# Patient Record
Sex: FEMALE | Race: WHITE | URBAN - METROPOLITAN AREA
[De-identification: names, ages, dates, MRNs, and addresses within clinical notes are randomized per-mention and may not be internally consistent; named-entity substitution may affect disease eponyms.]

---

## 2017-05-24 ENCOUNTER — EMERGENCY (EMERGENCY)
Facility: HOSPITAL | Age: 27
LOS: 0 days | Discharge: LEFT BEFORE DISCHARGE | End: 2017-05-24

## 2017-06-28 DIAGNOSIS — Z03.89 ENCOUNTER FOR OBSERVATION FOR OTHER SUSPECTED DISEASES AND CONDITIONS RULED OUT: ICD-10-CM

## 2017-06-29 ENCOUNTER — EMERGENCY (EMERGENCY)
Facility: HOSPITAL | Age: 27
LOS: 0 days | Discharge: HOME | End: 2017-06-29

## 2017-06-29 DIAGNOSIS — H57.8 OTHER SPECIFIED DISORDERS OF EYE AND ADNEXA: ICD-10-CM

## 2017-06-29 DIAGNOSIS — O21.0 MILD HYPEREMESIS GRAVIDARUM: ICD-10-CM

## 2017-06-29 DIAGNOSIS — O21.1 HYPEREMESIS GRAVIDARUM WITH METABOLIC DISTURBANCE: ICD-10-CM

## 2017-07-08 ENCOUNTER — EMERGENCY (EMERGENCY)
Facility: HOSPITAL | Age: 27
LOS: 0 days | Discharge: HOME | End: 2017-07-08

## 2017-07-08 DIAGNOSIS — O21.0 MILD HYPEREMESIS GRAVIDARUM: ICD-10-CM

## 2017-07-08 DIAGNOSIS — H00.011 HORDEOLUM EXTERNUM RIGHT UPPER EYELID: ICD-10-CM

## 2017-07-08 DIAGNOSIS — Z87.891 PERSONAL HISTORY OF NICOTINE DEPENDENCE: ICD-10-CM

## 2017-07-08 DIAGNOSIS — O21.1 HYPEREMESIS GRAVIDARUM WITH METABOLIC DISTURBANCE: ICD-10-CM

## 2017-07-12 ENCOUNTER — OUTPATIENT (OUTPATIENT)
Dept: OUTPATIENT SERVICES | Facility: HOSPITAL | Age: 27
LOS: 1 days | Discharge: HOME | End: 2017-07-12

## 2017-07-12 DIAGNOSIS — H00.11 CHALAZION RIGHT UPPER EYELID: ICD-10-CM

## 2017-07-12 DIAGNOSIS — O21.0 MILD HYPEREMESIS GRAVIDARUM: ICD-10-CM

## 2017-07-12 DIAGNOSIS — O21.1 HYPEREMESIS GRAVIDARUM WITH METABOLIC DISTURBANCE: ICD-10-CM

## 2017-11-02 ENCOUNTER — EMERGENCY (EMERGENCY)
Facility: HOSPITAL | Age: 27
LOS: 0 days | Discharge: HOME | End: 2017-11-02

## 2017-11-02 DIAGNOSIS — O21.0 MILD HYPEREMESIS GRAVIDARUM: ICD-10-CM

## 2017-11-02 DIAGNOSIS — O21.1 HYPEREMESIS GRAVIDARUM WITH METABOLIC DISTURBANCE: ICD-10-CM

## 2017-11-08 DIAGNOSIS — M54.5 LOW BACK PAIN: ICD-10-CM

## 2017-11-08 DIAGNOSIS — R10.13 EPIGASTRIC PAIN: ICD-10-CM

## 2018-03-22 ENCOUNTER — EMERGENCY (EMERGENCY)
Facility: HOSPITAL | Age: 28
LOS: 0 days | Discharge: HOME | End: 2018-03-22

## 2018-03-22 VITALS
DIASTOLIC BLOOD PRESSURE: 67 MMHG | OXYGEN SATURATION: 97 % | RESPIRATION RATE: 18 BRPM | TEMPERATURE: 97 F | HEART RATE: 81 BPM | SYSTOLIC BLOOD PRESSURE: 123 MMHG

## 2018-03-22 DIAGNOSIS — M54.9 DORSALGIA, UNSPECIFIED: ICD-10-CM

## 2018-03-22 DIAGNOSIS — M54.5 LOW BACK PAIN: ICD-10-CM

## 2018-03-22 RX ORDER — IBUPROFEN 200 MG
1 TABLET ORAL
Qty: 28 | Refills: 0 | OUTPATIENT
Start: 2018-03-22 | End: 2018-03-28

## 2018-03-22 RX ORDER — METHOCARBAMOL 500 MG/1
2 TABLET, FILM COATED ORAL
Qty: 30 | Refills: 0 | OUTPATIENT
Start: 2018-03-22 | End: 2018-03-26

## 2018-03-22 RX ORDER — IBUPROFEN 200 MG
600 TABLET ORAL ONCE
Qty: 0 | Refills: 0 | Status: DISCONTINUED | OUTPATIENT
Start: 2018-03-22 | End: 2018-03-22

## 2018-03-22 NOTE — ED PROVIDER NOTE - PHYSICAL EXAMINATION
PHYSICAL EXAM:    GENERAL: Alert, appears stated age, well appearing, non-toxic  SKIN: Warm, pink and dry. MMM.   EYE: Normal lids/conjunctiva  ENT: Normal hearing, patent oropharynx  NECK: +supple. No meningismus  Pulm: Bilateral BS, normal resp effort, no wheezes, stridor, or retractions  CV: RRR, no M/R/G, 2+ pulses throughout  Abd: soft, non-tender, non-distended, no hepatosplenomegaly. no CVA tenderness.   Mskel: no erythema, cyanosis, edema. +L paraspinal lumbar TTP. no spinal TTP.   Neuro: AAOx3, no sensory/motor deficits, CN 2-12 intact. No speech slurring, pronator drift, facial asymmetry. normal finger-to-nose b/l. 5/5 strength throughout. normal gait. negative romberg.

## 2018-03-22 NOTE — ED PROVIDER NOTE - PROGRESS NOTE DETAILS
Counseled on red flags and to return for them. Counseled on importance of follow up. Patient repeats back instructions. Patient advised that they or their doctor may call [phone] to follow up on the specific results of the tests performed today in the emergency department.   Patient appears well on discharge.

## 2018-03-22 NOTE — ED PROVIDER NOTE - OBJECTIVE STATEMENT
29 y/o F without PMH presents with R lower back pain radiating to R leg x 3 days s/p waking up in the am and hearing/feeling a "crack". No paraesthesias. no saddle anesthesia, bowel or bladder dysfunction. Denies CP, palpitations, SOB, abdominal pain, n/v/d, black or bloody stools, fevers, sweats, chills, HA, vision changes, trauma, fall, cough, recent travel, recent illness, sick contacts, leg pain/swelling, urinary symptoms, rash.

## 2018-03-22 NOTE — ED PROVIDER NOTE - NS ED ROS FT
Review of Systems    Constitutional: (-) fever  Eyes/ENT: (-) blurry vision  Cardiovascular: (-) chest pain, (-) syncope  Respiratory: (-) cough, (-) shortness of breath  Gastrointestinal: (-) vomiting, (-) diarrhea  Musculoskeletal: (-) neck pain  Integumentary: (-) rash, (-) edema  Neurological: (-) headache, (-) altered mental status

## 2018-03-26 ENCOUNTER — EMERGENCY (EMERGENCY)
Facility: HOSPITAL | Age: 28
LOS: 0 days | Discharge: HOME | End: 2018-03-26
Attending: EMERGENCY MEDICINE

## 2018-03-26 VITALS
SYSTOLIC BLOOD PRESSURE: 123 MMHG | RESPIRATION RATE: 18 BRPM | TEMPERATURE: 98 F | OXYGEN SATURATION: 98 % | DIASTOLIC BLOOD PRESSURE: 59 MMHG | HEART RATE: 68 BPM

## 2018-03-26 DIAGNOSIS — Z79.891 LONG TERM (CURRENT) USE OF OPIATE ANALGESIC: ICD-10-CM

## 2018-03-26 DIAGNOSIS — M54.5 LOW BACK PAIN: ICD-10-CM

## 2018-03-26 DIAGNOSIS — Z79.899 OTHER LONG TERM (CURRENT) DRUG THERAPY: ICD-10-CM

## 2018-03-26 RX ORDER — DEXAMETHASONE 0.5 MG/5ML
10 ELIXIR ORAL ONCE
Qty: 0 | Refills: 0 | Status: COMPLETED | OUTPATIENT
Start: 2018-03-26 | End: 2018-03-26

## 2018-03-26 RX ADMIN — Medication 10 MILLIGRAM(S): at 19:06

## 2018-03-26 NOTE — ED PROVIDER NOTE - OBJECTIVE STATEMENT
27 y/o F with no PMH presents complaining of lower midline lumbar pain for 7 days, worse on movement better at rest. No saddle anesthesia. No numbness or tingling. No IV drug use. No DM or steroid use. No incontinence.

## 2018-03-26 NOTE — ED PROVIDER NOTE - MEDICAL DECISION MAKING DETAILS
Chart finished.  29 yo woman with lower back pain improved with palpation shooting down legs occasionally.  Not respodning to ibuprofen and robaxin.   Will change to buprofen and tizandiine and add corticosteroids.  Outpatient follow up.  Normal Neuro examination.  Ambulating in ED.  No bowel or bladder issues.  Stable for discharge.

## 2018-09-02 ENCOUNTER — INPATIENT (INPATIENT)
Facility: HOSPITAL | Age: 28
LOS: 0 days | Discharge: HOME | End: 2018-09-02
Attending: HOSPITALIST | Admitting: HOSPITALIST
Payer: MEDICAID

## 2018-09-02 VITALS
HEART RATE: 87 BPM | RESPIRATION RATE: 18 BRPM | WEIGHT: 169.98 LBS | HEIGHT: 70 IN | OXYGEN SATURATION: 97 % | DIASTOLIC BLOOD PRESSURE: 76 MMHG | TEMPERATURE: 98 F | SYSTOLIC BLOOD PRESSURE: 128 MMHG

## 2018-09-02 VITALS
DIASTOLIC BLOOD PRESSURE: 83 MMHG | RESPIRATION RATE: 17 BRPM | OXYGEN SATURATION: 100 % | TEMPERATURE: 98 F | SYSTOLIC BLOOD PRESSURE: 134 MMHG | HEART RATE: 65 BPM

## 2018-09-02 LAB
ALBUMIN SERPL ELPH-MCNC: 4.1 G/DL — SIGNIFICANT CHANGE UP (ref 3.5–5.2)
ALBUMIN SERPL ELPH-MCNC: 4.7 G/DL — SIGNIFICANT CHANGE UP (ref 3.5–5.2)
ALP SERPL-CCNC: 48 U/L — SIGNIFICANT CHANGE UP (ref 30–115)
ALP SERPL-CCNC: 52 U/L — SIGNIFICANT CHANGE UP (ref 30–115)
ALT FLD-CCNC: 11 U/L — SIGNIFICANT CHANGE UP (ref 0–41)
ALT FLD-CCNC: 13 U/L — SIGNIFICANT CHANGE UP (ref 0–41)
ANION GAP SERPL CALC-SCNC: 13 MMOL/L — SIGNIFICANT CHANGE UP (ref 7–14)
ANION GAP SERPL CALC-SCNC: 14 MMOL/L — SIGNIFICANT CHANGE UP (ref 7–14)
APPEARANCE UR: CLEAR — SIGNIFICANT CHANGE UP
AST SERPL-CCNC: 12 U/L — SIGNIFICANT CHANGE UP (ref 0–41)
AST SERPL-CCNC: 13 U/L — SIGNIFICANT CHANGE UP (ref 0–41)
BACTERIA # UR AUTO: ABNORMAL /HPF
BASOPHILS # BLD AUTO: 0.03 K/UL — SIGNIFICANT CHANGE UP (ref 0–0.2)
BASOPHILS # BLD AUTO: 0.04 K/UL — SIGNIFICANT CHANGE UP (ref 0–0.2)
BASOPHILS NFR BLD AUTO: 0.3 % — SIGNIFICANT CHANGE UP (ref 0–1)
BASOPHILS NFR BLD AUTO: 0.3 % — SIGNIFICANT CHANGE UP (ref 0–1)
BILIRUB DIRECT SERPL-MCNC: <0.2 MG/DL — SIGNIFICANT CHANGE UP (ref 0–0.2)
BILIRUB INDIRECT FLD-MCNC: >0.2 MG/DL — SIGNIFICANT CHANGE UP (ref 0.2–1.2)
BILIRUB SERPL-MCNC: 0.3 MG/DL — SIGNIFICANT CHANGE UP (ref 0.2–1.2)
BILIRUB SERPL-MCNC: 0.4 MG/DL — SIGNIFICANT CHANGE UP (ref 0.2–1.2)
BILIRUB UR-MCNC: NEGATIVE — SIGNIFICANT CHANGE UP
BUN SERPL-MCNC: 14 MG/DL — SIGNIFICANT CHANGE UP (ref 10–20)
BUN SERPL-MCNC: 18 MG/DL — SIGNIFICANT CHANGE UP (ref 10–20)
CALCIUM SERPL-MCNC: 8.9 MG/DL — SIGNIFICANT CHANGE UP (ref 8.5–10.1)
CALCIUM SERPL-MCNC: 9.3 MG/DL — SIGNIFICANT CHANGE UP (ref 8.5–10.1)
CHLORIDE SERPL-SCNC: 102 MMOL/L — SIGNIFICANT CHANGE UP (ref 98–110)
CHLORIDE SERPL-SCNC: 103 MMOL/L — SIGNIFICANT CHANGE UP (ref 98–110)
CO2 SERPL-SCNC: 24 MMOL/L — SIGNIFICANT CHANGE UP (ref 17–32)
CO2 SERPL-SCNC: 25 MMOL/L — SIGNIFICANT CHANGE UP (ref 17–32)
COLOR SPEC: YELLOW — SIGNIFICANT CHANGE UP
CREAT SERPL-MCNC: 0.7 MG/DL — SIGNIFICANT CHANGE UP (ref 0.7–1.5)
CREAT SERPL-MCNC: 0.7 MG/DL — SIGNIFICANT CHANGE UP (ref 0.7–1.5)
DIFF PNL FLD: NEGATIVE — SIGNIFICANT CHANGE UP
EOSINOPHIL # BLD AUTO: 0.06 K/UL — SIGNIFICANT CHANGE UP (ref 0–0.7)
EOSINOPHIL # BLD AUTO: 0.08 K/UL — SIGNIFICANT CHANGE UP (ref 0–0.7)
EOSINOPHIL NFR BLD AUTO: 0.7 % — SIGNIFICANT CHANGE UP (ref 0–8)
EOSINOPHIL NFR BLD AUTO: 0.7 % — SIGNIFICANT CHANGE UP (ref 0–8)
EPI CELLS # UR: ABNORMAL /HPF
GLUCOSE SERPL-MCNC: 106 MG/DL — HIGH (ref 70–99)
GLUCOSE SERPL-MCNC: 107 MG/DL — HIGH (ref 70–99)
GLUCOSE UR QL: NEGATIVE — SIGNIFICANT CHANGE UP
HCG SERPL QL: NEGATIVE — SIGNIFICANT CHANGE UP
HCT VFR BLD CALC: 35.9 % — LOW (ref 37–47)
HCT VFR BLD CALC: 38.9 % — SIGNIFICANT CHANGE UP (ref 37–47)
HGB BLD-MCNC: 11.7 G/DL — LOW (ref 12–16)
HGB BLD-MCNC: 12.8 G/DL — SIGNIFICANT CHANGE UP (ref 12–16)
IMM GRANULOCYTES NFR BLD AUTO: 0.3 % — SIGNIFICANT CHANGE UP (ref 0.1–0.3)
IMM GRANULOCYTES NFR BLD AUTO: 0.3 % — SIGNIFICANT CHANGE UP (ref 0.1–0.3)
KETONES UR-MCNC: NEGATIVE — SIGNIFICANT CHANGE UP
LACTATE SERPL-SCNC: 0.9 MMOL/L — SIGNIFICANT CHANGE UP (ref 0.5–2.2)
LEUKOCYTE ESTERASE UR-ACNC: NEGATIVE — SIGNIFICANT CHANGE UP
LIDOCAIN IGE QN: 87 U/L — HIGH (ref 7–60)
LYMPHOCYTES # BLD AUTO: 18.2 % — LOW (ref 20.5–51.1)
LYMPHOCYTES # BLD AUTO: 2.13 K/UL — SIGNIFICANT CHANGE UP (ref 1.2–3.4)
LYMPHOCYTES # BLD AUTO: 2.2 K/UL — SIGNIFICANT CHANGE UP (ref 1.2–3.4)
LYMPHOCYTES # BLD AUTO: 23.3 % — SIGNIFICANT CHANGE UP (ref 20.5–51.1)
MAGNESIUM SERPL-MCNC: 1.8 MG/DL — SIGNIFICANT CHANGE UP (ref 1.8–2.4)
MAGNESIUM SERPL-MCNC: 1.9 MG/DL — SIGNIFICANT CHANGE UP (ref 1.8–2.4)
MCHC RBC-ENTMCNC: 29.3 PG — SIGNIFICANT CHANGE UP (ref 27–31)
MCHC RBC-ENTMCNC: 29.3 PG — SIGNIFICANT CHANGE UP (ref 27–31)
MCHC RBC-ENTMCNC: 32.6 G/DL — SIGNIFICANT CHANGE UP (ref 32–37)
MCHC RBC-ENTMCNC: 32.9 G/DL — SIGNIFICANT CHANGE UP (ref 32–37)
MCV RBC AUTO: 89 FL — SIGNIFICANT CHANGE UP (ref 81–99)
MCV RBC AUTO: 89.8 FL — SIGNIFICANT CHANGE UP (ref 81–99)
MONOCYTES # BLD AUTO: 0.67 K/UL — HIGH (ref 0.1–0.6)
MONOCYTES # BLD AUTO: 0.85 K/UL — HIGH (ref 0.1–0.6)
MONOCYTES NFR BLD AUTO: 7 % — SIGNIFICANT CHANGE UP (ref 1.7–9.3)
MONOCYTES NFR BLD AUTO: 7.3 % — SIGNIFICANT CHANGE UP (ref 1.7–9.3)
NEUTROPHILS # BLD AUTO: 6.24 K/UL — SIGNIFICANT CHANGE UP (ref 1.4–6.5)
NEUTROPHILS # BLD AUTO: 8.86 K/UL — HIGH (ref 1.4–6.5)
NEUTROPHILS NFR BLD AUTO: 68.1 % — SIGNIFICANT CHANGE UP (ref 42.2–75.2)
NEUTROPHILS NFR BLD AUTO: 73.5 % — SIGNIFICANT CHANGE UP (ref 42.2–75.2)
NITRITE UR-MCNC: NEGATIVE — SIGNIFICANT CHANGE UP
NRBC # BLD: 0 /100 WBCS — SIGNIFICANT CHANGE UP (ref 0–0)
PH UR: 7 — SIGNIFICANT CHANGE UP (ref 5–8)
PLATELET # BLD AUTO: 209 K/UL — SIGNIFICANT CHANGE UP (ref 130–400)
PLATELET # BLD AUTO: 230 K/UL — SIGNIFICANT CHANGE UP (ref 130–400)
POTASSIUM SERPL-MCNC: 3.8 MMOL/L — SIGNIFICANT CHANGE UP (ref 3.5–5)
POTASSIUM SERPL-MCNC: 4.2 MMOL/L — SIGNIFICANT CHANGE UP (ref 3.5–5)
POTASSIUM SERPL-SCNC: 3.8 MMOL/L — SIGNIFICANT CHANGE UP (ref 3.5–5)
POTASSIUM SERPL-SCNC: 4.2 MMOL/L — SIGNIFICANT CHANGE UP (ref 3.5–5)
PROT SERPL-MCNC: 6.2 G/DL — SIGNIFICANT CHANGE UP (ref 6–8)
PROT SERPL-MCNC: 6.9 G/DL — SIGNIFICANT CHANGE UP (ref 6–8)
PROT UR-MCNC: ABNORMAL
RBC # BLD: 4 M/UL — LOW (ref 4.2–5.4)
RBC # BLD: 4.37 M/UL — SIGNIFICANT CHANGE UP (ref 4.2–5.4)
RBC # FLD: 12.4 % — SIGNIFICANT CHANGE UP (ref 11.5–14.5)
RBC # FLD: 12.5 % — SIGNIFICANT CHANGE UP (ref 11.5–14.5)
SODIUM SERPL-SCNC: 139 MMOL/L — SIGNIFICANT CHANGE UP (ref 135–146)
SODIUM SERPL-SCNC: 142 MMOL/L — SIGNIFICANT CHANGE UP (ref 135–146)
SP GR SPEC: >=1.03 — SIGNIFICANT CHANGE UP (ref 1.01–1.03)
UROBILINOGEN FLD QL: 0.2 — SIGNIFICANT CHANGE UP (ref 0.2–0.2)
WBC # BLD: 12.07 K/UL — HIGH (ref 4.8–10.8)
WBC # BLD: 9.16 K/UL — SIGNIFICANT CHANGE UP (ref 4.8–10.8)
WBC # FLD AUTO: 12.07 K/UL — HIGH (ref 4.8–10.8)
WBC # FLD AUTO: 9.16 K/UL — SIGNIFICANT CHANGE UP (ref 4.8–10.8)

## 2018-09-02 PROCEDURE — 99284 EMERGENCY DEPT VISIT MOD MDM: CPT

## 2018-09-02 RX ORDER — TRAMADOL HYDROCHLORIDE 50 MG/1
25 TABLET ORAL THREE TIMES A DAY
Qty: 0 | Refills: 0 | Status: DISCONTINUED | OUTPATIENT
Start: 2018-09-02 | End: 2018-09-02

## 2018-09-02 RX ORDER — SODIUM CHLORIDE 9 MG/ML
1000 INJECTION INTRAMUSCULAR; INTRAVENOUS; SUBCUTANEOUS ONCE
Qty: 0 | Refills: 0 | Status: COMPLETED | OUTPATIENT
Start: 2018-09-02 | End: 2018-09-02

## 2018-09-02 RX ORDER — ONDANSETRON 8 MG/1
4 TABLET, FILM COATED ORAL ONCE
Qty: 0 | Refills: 0 | Status: COMPLETED | OUTPATIENT
Start: 2018-09-02 | End: 2018-09-02

## 2018-09-02 RX ORDER — ONDANSETRON 8 MG/1
4 TABLET, FILM COATED ORAL THREE TIMES A DAY
Qty: 0 | Refills: 0 | Status: DISCONTINUED | OUTPATIENT
Start: 2018-09-02 | End: 2018-09-02

## 2018-09-02 RX ADMIN — ONDANSETRON 4 MILLIGRAM(S): 8 TABLET, FILM COATED ORAL at 04:41

## 2018-09-02 RX ADMIN — SODIUM CHLORIDE 1000 MILLILITER(S): 9 INJECTION INTRAMUSCULAR; INTRAVENOUS; SUBCUTANEOUS at 04:41

## 2018-09-02 NOTE — ED PROVIDER NOTE - OBJECTIVE STATEMENT
27 yo F with pshx of umbilical hernia repair presenting with sudden onset of crampy, epigastric pain radiating to RUQ that woke her up at 12am associated with nausea and vomiting. No cp, sob, fever, chills, diarrhea, back pain, urinary symptoms, headache, dizziness, paresthesias, or weakness.

## 2018-09-02 NOTE — CONSULT NOTE ADULT - SUBJECTIVE AND OBJECTIVE BOX
HPI: Patient is a 28y old  Female with no known PMHx who presents c/o RUQ/ and epigastric pain that woke her up tonight at 12am. PAin is radiating to the back. She also reports vomiting. She never had similar pain in the past    PAST MEDICAL & SURGICAL HISTORY:  No pertinent past medical history  No significant past surgical history    SOCIAL HISTORY:  Smoker    ALLERGIES: No Known Allergies    HOME MEDICATIONS:  None    Vitals:   T(C): 36.4 (09-02-18 @ 02:07), Max: 36.4 (09-02-18 @ 02:07)  HR: 87 (09-02-18 @ 02:07) (87 - 87)  BP: 128/76 (09-02-18 @ 02:07) (128/76 - 128/76)  RR: 18 (09-02-18 @ 02:07) (18 - 18)  SpO2: 97% (09-02-18 @ 02:07) (97% - 97%)    PHYSICAL EXAM:   GENERAL: NAD, well-developed  ABDOMEN: Tenderness in the epigastric and RUQ area. Soft, Nondistended; Bowel sounds present  PSYCH: AAOx3    LABS  CBC (09-02 @ 04:02)                              12.8                           12.07<H>  )----------------(  230        73.5  % Neutrophils, 18.2<L>% Lymphocytes, ANC: 8.86<H>                              38.9      BMP (09-02 @ 04:02)             142     |  103     |  18    		Ca++ --      Ca 9.3                ---------------------------------( 106<H>		Mg 1.9                4.2     |  25      |  0.7   			Ph --        LFTs (09-02 @ 04:02)      TPro 6.9 / Alb 4.7 / TBili 0.3 / DBili -- / AST 13 / ALT 13 / AlkPhos 52        ABG (09-02 @ 04:02)      /  /  /  /  / %     Lactate:  0.9      --------------------------------------------------------------------------------------------    MICROBIOLOGY  Urinalysis (09-02 @ 04:02):     Color: Yellow / Appearance: Clear / SG: >=1.030 / pH: 7.0 / Gluc: Negative / Ketones: Negative / Bili: Negative / Urobili: 0.2 / Protein :Trace<!> / Nitrites: Negative / Leuk.Est: Negative / RBC:  / WBC:  / Sq Epi:  / Non Sq Epi: Few<!> / Bacteria Few<!>     --------------------------------------------------------------------------------------------    I&O's Summary      IMAGING:  < from: US Abdomen Limited (09.02.18 @ 04:29) >  IMPRESSION:  1.  Cholelithiasis without sonographic evidence of cholecystitis.  2.  Dilated CBD measuring 7 mm, borderline dilated; consider correlation   with LFTs.       ASSESSMENT: 28y Female    PLAN: HPI: Patient is a 28y old  Female with no known PMHx who presents c/o RUQ/ and epigastric pain that woke her up tonight at 12am. PAin is radiating to the back. She also reports vomiting. She never had similar pain in the past    PAST MEDICAL & SURGICAL HISTORY:  No pertinent past medical history  No significant past surgical history    SOCIAL HISTORY:  Smoker    ALLERGIES: No Known Allergies    HOME MEDICATIONS:  None    Vitals:   T(C): 36.4 (09-02-18 @ 02:07), Max: 36.4 (09-02-18 @ 02:07)  HR: 87 (09-02-18 @ 02:07) (87 - 87)  BP: 128/76 (09-02-18 @ 02:07) (128/76 - 128/76)  RR: 18 (09-02-18 @ 02:07) (18 - 18)  SpO2: 97% (09-02-18 @ 02:07) (97% - 97%)    PHYSICAL EXAM:   GENERAL: NAD, well-developed  ABDOMEN: Tenderness in the epigastric and RUQ area. Soft, Nondistended; Bowel sounds present  PSYCH: AAOx3    LABS  CBC (09-02 @ 04:02)                              12.8                           12.07<H>  )----------------(  230        73.5  % Neutrophils, 18.2<L>% Lymphocytes, ANC: 8.86<H>                              38.9      BMP (09-02 @ 04:02)             142     |  103     |  18    		Ca++ --      Ca 9.3                ---------------------------------( 106<H>		Mg 1.9                4.2     |  25      |  0.7   			Ph --        LFTs (09-02 @ 04:02)      TPro 6.9 / Alb 4.7 / TBili 0.3 / DBili -- / AST 13 / ALT 13 / AlkPhos 52        ABG (09-02 @ 04:02)      /  /  /  /  / %     Lactate:  0.9      --------------------------------------------------------------------------------------------    MICROBIOLOGY  Urinalysis (09-02 @ 04:02):     Color: Yellow / Appearance: Clear / SG: >=1.030 / pH: 7.0 / Gluc: Negative / Ketones: Negative / Bili: Negative / Urobili: 0.2 / Protein :Trace<!> / Nitrites: Negative / Leuk.Est: Negative / RBC:  / WBC:  / Sq Epi:  / Non Sq Epi: Few<!> / Bacteria Few<!>     --------------------------------------------------------------------------------------------    I&O's Summary      IMAGING:  < from: US Abdomen Limited (09.02.18 @ 04:29) >  IMPRESSION:  1.  Cholelithiasis without sonographic evidence of cholecystitis.  2.  Dilated CBD measuring 7 mm, borderline dilated; consider correlation   with LFTs. HPI: Patient is a 28y old  Female with no known PMHx who presents c/o RUQ/ and epigastric pain that woke her up tonight at 12am. PAin is radiating to the back. She also reports vomiting. She never had similar pain in the past. Patient pain free today    PAST MEDICAL & SURGICAL HISTORY:  No pertinent past medical history  No significant past surgical history    SOCIAL HISTORY:  Smoker 2 children 8 and 4 yr    ALLERGIES: No Known Allergies    HOME MEDICATIONS:  None    Vitals:   T(C): 36.4 (09-02-18 @ 02:07), Max: 36.4 (09-02-18 @ 02:07)  HR: 87 (09-02-18 @ 02:07) (87 - 87)  BP: 128/76 (09-02-18 @ 02:07) (128/76 - 128/76)  RR: 18 (09-02-18 @ 02:07) (18 - 18)  SpO2: 97% (09-02-18 @ 02:07) (97% - 97%)    PHYSICAL EXAM:   GENERAL: NAD, well-developed  ABDOMEN: Tenderness in the epigastric and RUQ area. Soft, Nondistended; Bowel sounds present. Old umbilical scar of umbilical hernia done at [facility]   PSYCH: AAOx3    LABS  CBC (09-02 @ 04:02)                              12.8                           12.07<H>  )----------------(  230        73.5  % Neutrophils, 18.2<L>% Lymphocytes, ANC: 8.86<H>                              38.9      BMP (09-02 @ 04:02)             142     |  103     |  18    		Ca++ --      Ca 9.3                ---------------------------------( 106<H>		Mg 1.9                4.2     |  25      |  0.7   			Ph --        LFTs (09-02 @ 04:02)      TPro 6.9 / Alb 4.7 / TBili 0.3 / DBili -- / AST 13 / ALT 13 / AlkPhos 52        ABG (09-02 @ 04:02)      /  /  /  /  / %     Lactate:  0.9      --------------------------------------------------------------------------------------------    MICROBIOLOGY  Urinalysis (09-02 @ 04:02):     Color: Yellow / Appearance: Clear / SG: >=1.030 / pH: 7.0 / Gluc: Negative / Ketones: Negative / Bili: Negative / Urobili: 0.2 / Protein :Trace<!> / Nitrites: Negative / Leuk.Est: Negative / RBC:  / WBC:  / Sq Epi:  / Non Sq Epi: Few<!> / Bacteria Few<!>     --------------------------------------------------------------------------------------------    I&O's Summary      IMAGING:  < from: US Abdomen Limited (09.02.18 @ 04:29) >  IMPRESSION:  1.  Cholelithiasis without sonographic evidence of cholecystitis.  2.  Dilated CBD measuring 7 mm, borderline dilated; consider correlation   with LFTs.

## 2018-09-02 NOTE — ED PROVIDER NOTE - ATTENDING CONTRIBUTION TO CARE
29 yo f with pmh of hernia repair, presents with c/o epig and ruq pain since midnight.  +n/v, no diarrhea.  no fever, but has chills.  no cp no sob.  exam: nad, ncat, perrl, eomi, mmm, rrr, ctab, abd soft, mildly ttp ruq and epig, no rebound, no guarding imp: pt with ruq pain, will check labs and US

## 2018-09-02 NOTE — ED ADULT NURSE NOTE - NSIMPLEMENTINTERV_GEN_ALL_ED
Implemented All Universal Safety Interventions:  [place] to call system. Call bell, personal items and telephone within reach. Instruct patient to call for assistance. Room bathroom lighting operational. Non-slip footwear when patient is off stretcher. Physically safe environment: no spills, clutter or unnecessary equipment. Stretcher in lowest position, wheels locked, appropriate side rails in place.

## 2018-09-02 NOTE — ED PROVIDER NOTE - NS ED ROS FT
Review of Systems:  	•	CONSTITUTIONAL - no fever, no diaphoresis, no chills  	•	SKIN - no rash  	•	HEMATOLOGIC - no bleeding, no bruising  	•	EYES - no eye pain, no blurry vision  	•	ENT - no congestion  	•	RESPIRATORY - no shortness of breath, no cough  	•	CARDIAC - no chest pain, no palpitations  	•	GI - + abd pain, + nausea, + vomiting, no diarrhea, no constipation  	•	GENITO-URINARY - no dysuria; no hematuria, no increased urinary frequency  	•	MUSCULOSKELETAL - no joint paint, no swelling, no redness  	•	NEUROLOGIC - no weakness, no headache, no paresthesias, no LOC  	All other ROS are negative except as documented in HPI.

## 2018-09-02 NOTE — ED PROVIDER NOTE - PROGRESS NOTE DETAILS
Dr. Guerin accepting admission. Surgery consulted pt seen by resident Dr. Macedo recommending admission for GI eval Surgery consulted pt seen by resident Dr. Macedo recommending admission for GI eval.

## 2018-09-02 NOTE — CONSULT NOTE ADULT - ASSESSMENT
Assessment    Patient is a 28y old  Female with no known PMHx who presents c/o RUQ/ and epigastric pain that woke her up tonight at 12am. PAin is radiating to the back. She also reports vomiting. She never had similar pain in the past. wbc 12.07. US of the abdomen showing cholelithiasis and dilated CBD of 7mm.     Plan:  GI for MRCP  f/u LFTs Assessment    Patient is a 28y old  Female with no known PMHx who presents c/o RUQ/ and epigastric pain that woke her up tonight at 12am. PAin is radiating to the back. She also reports vomiting. She never had similar pain in the past. wbc 12.07. US of the abdomen showing cholelithiasis and dilated CBD of 7mm.     Plan:  GI for possible MRCP  f/u LFTs

## 2018-09-02 NOTE — ED ADULT NURSE NOTE - OBJECTIVE STATEMENT
Patient states she is experiencing abdominal pain starting tonight which woke her up out of her sleep. Describes it as a pressure in her abdomen radiating up into check and around to her back. Mildly relieved with vomiting.

## 2018-09-02 NOTE — H&P ADULT - NSHPPHYSICALEXAM_GEN_ALL_CORE
ICU Vital Signs Last 24 Hrs  T(C): 36.4 (02 Sep 2018 02:07), Max: 36.4 (02 Sep 2018 02:07)  T(F): 97.5 (02 Sep 2018 02:07), Max: 97.5 (02 Sep 2018 02:07)  HR: 87 (02 Sep 2018 02:07) (87 - 87)  BP: 128/76 (02 Sep 2018 02:07) (128/76 - 128/76)  BP(mean): --  ABP: --  ABP(mean): --  RR: 18 (02 Sep 2018 02:07) (18 - 18)  SpO2: 97% (02 Sep 2018 02:07) (97% - 97%) ICU Vital Signs Last 24 Hrs  T(C): 36.4 (02 Sep 2018 02:07), Max: 36.4 (02 Sep 2018 02:07)  T(F): 97.5 (02 Sep 2018 02:07), Max: 97.5 (02 Sep 2018 02:07)  HR: 87 (02 Sep 2018 02:07) (87 - 87)  BP: 128/76 (02 Sep 2018 02:07) (128/76 - 128/76)  BP(mean): --  ABP: --  ABP(mean): --  RR: 18 (02 Sep 2018 02:07) (18 - 18)  SpO2: 97% (02 Sep 2018 02:07) (97% - 97%)      Physical examination   General: Comfortable- in no acute distress  Abdominal exam: Mild tenderness in RUQ otherwise non tender, non distended and soft   CVS: S1 + S2  Pulmonary: Bilateral vesicular breathing   Extremities: Non edema or cyanosis

## 2018-09-02 NOTE — CONSULT NOTE ADULT - SUBJECTIVE AND OBJECTIVE BOX
Patient is a 28y old  Female who presents with a chief complaint of Abdominal pain      HPI:  This patient is 29 y/o female with no significant past medical history and surgical history significant for umblical hernia repair. She is admitted in hospital with chief complaint of abdominal pain that started last night. As per patient pain started around umbilical region. It then migrated to epigastric region and then radiated to her back and right shoulder. It was associated with 1 episode of vomiting. After vomiting her pain subsided.   Patient denies chest pain, shortness of breath, diarrhea, constipation, fevers, rash, recent travel and trauma.     ED vitals were all within normal range  In ED ultrasound of abdomen was done which showed cholelithiasis without cholecystitis. Also to note was CBD dilatation of 7mm.     US 4/2013 for vomiting: wnl , cbd 3.6 mm         PAST MEDICAL & SURGICAL HISTORY:  No pertinent past medical history  No significant past surgical history      Home Medications:      MEDICATIONS  (STANDING):    MEDICATIONS  (PRN):  ondansetron    Tablet 4 milliGRAM(s) Oral three times a day PRN Nausea and/or Vomiting  traMADol 25 milliGRAM(s) Oral three times a day PRN Moderate Pain (4 - 6)      Allergies    No Known Allergies    Intolerances        FAMILY HISTORY:      SOCIAL    REVIEW OF SYSTEMS  General: mild fatigue   Skin: no rash   Ophtalmologic: no visual changes   Respiratory: no shortness of breath   Cardiovascular: no chest pain   Gastrointestinal: as per H&P   Genitourinary: no dysuria   Neurological: no weakness   otherwise as described above     Vital Signs Last 24 Hrs  T(C): 36.4 (02 Sep 2018 02:07), Max: 36.4 (02 Sep 2018 02:07)  T(F): 97.5 (02 Sep 2018 02:07), Max: 97.5 (02 Sep 2018 02:07)  HR: 87 (02 Sep 2018 02:07) (87 - 87)  BP: 128/76 (02 Sep 2018 02:07) (128/76 - 128/76)  BP(mean): --  RR: 18 (02 Sep 2018 02:07) (18 - 18)  SpO2: 97% (02 Sep 2018 02:07) (97% - 97%)    GENERAL:  no distress  SKIN: intact   HEENT:  NC/AT,  anicteric  CHEST:   no increased effort, breath sounds clear  HEART:  Regular rhythm  ABDOMEN:  Soft, non-tender, non-distended, normoactive bowel sounds,  no masses ,no hepato-splenomegaly, no signs of chronic liver disease  EXTEREMITIES:  no cyanosis        CBC Full  -  ( 02 Sep 2018 04:02 )  WBC Count : 12.07 K/uL  Hemoglobin : 12.8 g/dL  Hematocrit : 38.9 %  Platelet Count - Automated : 230 K/uL  Mean Cell Volume : 89.0 fL  Mean Cell Hemoglobin : 29.3 pg  Mean Cell Hemoglobin Concentration : 32.9 g/dL  Auto Neutrophil # : 8.86 K/uL  Auto Lymphocyte # : 2.20 K/uL  Auto Monocyte # : 0.85 K/uL  Auto Eosinophil # : 0.08 K/uL  Auto Basophil # : 0.04 K/uL  Auto Neutrophil % : 73.5 %  Auto Lymphocyte % : 18.2 %  Auto Monocyte % : 7.0 %  Auto Eosinophil % : 0.7 %  Auto Basophil % : 0.3 %      Hemoglobin: 12.8 g/dL (09-02-18 @ 04:02)  Alkaline Phosphatase, Serum: 52 U/L (09-02-18 @ 04:02)  Bilirubin Total, Serum: 0.3 mg/dL (09-02-18 @ 04:02)  Aspartate Aminotransferase (AST/SGOT): 13 U/L (09-02-18 @ 04:02)  Alanine Aminotransferase (ALT/SGPT): 13 U/L (09-02-18 @ 04:02)          09-02    142  |  103  |  18  ----------------------------<  106<H>  4.2   |  25  |  0.7    Ca    9.3      02 Sep 2018 04:02  Mg     1.9     09-02    TPro  6.9  /  Alb  4.7  /  TBili  0.3  /  DBili  x   /  AST  13  /  ALT  13  /  AlkPhos  52  09-02        AMYLASE                  09-02 @ 04:02   --  LIPASE                   09-02 @ 04:02  87  HCG  --                    09-02 @ 04:02          RADIOLOGY Patient is a 28y old  Female who presents with a chief complaint of Abdominal pain      HPI:  This patient is 27 y/o female with no significant past medical history and surgical history significant for umblical hernia repair p/for 1 day hx of epigastric / ruq pain, stabbing, severe, awakening her at night, radiating to her back and right shoulder, associated with nausea, 2 x non bloody vomiting, never had similar symptoms in the past.     no gi  no egd or colonoscopy   FH great grandmother and grandmother had uterine ca   US 4/2013 for vomiting: wnl , cbd 3.6 mm   usually has 1 bm q1-2 days, + weight gain       PAST MEDICAL & SURGICAL HISTORY:  No pertinent past medical history  No significant past surgical history      Home Medications:      MEDICATIONS  (STANDING):    MEDICATIONS  (PRN):  ondansetron    Tablet 4 milliGRAM(s) Oral three times a day PRN Nausea and/or Vomiting  traMADol 25 milliGRAM(s) Oral three times a day PRN Moderate Pain (4 - 6)      Allergies    No Known Allergies    Intolerances        FAMILY HISTORY:      SOCIAL    REVIEW OF SYSTEMS  General: mild fatigue   Skin: no rash   Ophtalmologic: no visual changes   Respiratory: no shortness of breath   Cardiovascular: no chest pain   Gastrointestinal: as per H&P   Genitourinary: no dysuria   Neurological: no weakness   otherwise as described above     Vital Signs Last 24 Hrs  T(C): 36.4 (02 Sep 2018 02:07), Max: 36.4 (02 Sep 2018 02:07)  T(F): 97.5 (02 Sep 2018 02:07), Max: 97.5 (02 Sep 2018 02:07)  HR: 87 (02 Sep 2018 02:07) (87 - 87)  BP: 128/76 (02 Sep 2018 02:07) (128/76 - 128/76)  BP(mean): --  RR: 18 (02 Sep 2018 02:07) (18 - 18)  SpO2: 97% (02 Sep 2018 02:07) (97% - 97%)    GENERAL:  no distress  SKIN: intact   HEENT:  NC/AT,  anicteric  CHEST:   no increased effort, breath sounds clear  HEART:  Regular rhythm  ABDOMEN:  Soft, non-tender, non-distended, normoactive bowel sounds,  no masses ,no hepato-splenomegaly, no signs of chronic liver disease  EXTEREMITIES:  no cyanosis        CBC Full  -  ( 02 Sep 2018 04:02 )  WBC Count : 12.07 K/uL  Hemoglobin : 12.8 g/dL  Hematocrit : 38.9 %  Platelet Count - Automated : 230 K/uL  Mean Cell Volume : 89.0 fL  Mean Cell Hemoglobin : 29.3 pg  Mean Cell Hemoglobin Concentration : 32.9 g/dL  Auto Neutrophil # : 8.86 K/uL  Auto Lymphocyte # : 2.20 K/uL  Auto Monocyte # : 0.85 K/uL  Auto Eosinophil # : 0.08 K/uL  Auto Basophil # : 0.04 K/uL  Auto Neutrophil % : 73.5 %  Auto Lymphocyte % : 18.2 %  Auto Monocyte % : 7.0 %  Auto Eosinophil % : 0.7 %  Auto Basophil % : 0.3 %      Hemoglobin: 12.8 g/dL (09-02-18 @ 04:02)  Alkaline Phosphatase, Serum: 52 U/L (09-02-18 @ 04:02)  Bilirubin Total, Serum: 0.3 mg/dL (09-02-18 @ 04:02)  Aspartate Aminotransferase (AST/SGOT): 13 U/L (09-02-18 @ 04:02)  Alanine Aminotransferase (ALT/SGPT): 13 U/L (09-02-18 @ 04:02)          09-02    142  |  103  |  18  ----------------------------<  106<H>  4.2   |  25  |  0.7    Ca    9.3      02 Sep 2018 04:02  Mg     1.9     09-02    TPro  6.9  /  Alb  4.7  /  TBili  0.3  /  DBili  x   /  AST  13  /  ALT  13  /  AlkPhos  52  09-02        AMYLASE                  09-02 @ 04:02   --  LIPASE                   09-02 @ 04:02  87  HCG  --                    09-02 @ 04:02          RADIOLOGY

## 2018-09-02 NOTE — ED PROVIDER NOTE - PHYSICAL EXAMINATION
VITAL SIGNS: I have reviewed nursing notes and confirm.  CONSTITUTIONAL: Well-developed; well-nourished; in no acute distress.  SKIN: Skin exam is warm and dry, no acute rash.  HEAD: Normocephalic; atraumatic.  EYES: PERRL, EOM intact; conjunctiva and sclera clear.  ENT: No nasal discharge; airway clear.   NECK: Supple; non tender.  CARD: S1, S2 normal; no murmurs, gallops, or rubs. Regular rate and rhythm.  RESP: Clear to auscultation bilaterally. No wheezes, rales or rhonchi.  ABD: Normal bowel sounds; soft; non-distended; +epigastric tenderness and RUQ tenderness. No rebound tenderness or guarding.   EXT: Normal ROM. No edema.  LYMPH: No acute cervical adenopathy.  NEURO: Alert, oriented. Grossly unremarkable. No focal deficits.  PSYCH: Cooperative, appropriate.

## 2018-09-02 NOTE — H&P ADULT - NSHPLABSRESULTS_GEN_ALL_CORE
12.8   12.07 )-----------( 230      ( 02 Sep 2018 04:02 )             38.9     09-02    142  |  103  |  18  ----------------------------<  106<H>  4.2   |  25  |  0.7    Ca    9.3      02 Sep 2018 04:02  Mg     1.9     09-02    TPro  6.9  /  Alb  4.7  /  TBili  0.3  /  DBili  x   /  AST  13  /  ALT  13  /  AlkPhos  52  09-02       US Abdomen Limited (09.02.18 @ 04:29)  IMPRESSION:  1.  Cholelithiasis without sonographic evidence of cholecystitis.  2.  Dilated CBD measuring 7 mm, borderline dilated; consider correlation   with LFTs.

## 2018-09-02 NOTE — CONSULT NOTE ADULT - ASSESSMENT
This patient is 29 y/o female with no significant past medical history and surgical history significant for umblical hernia repair p/for 1 day hx of epigastric / ruq pain, stabbing, severe, awakening her at night, radiating to her back and right shoulder, associated with nausea, 2 x non bloody vomiting, never had similar symptoms in the past.     1- RUQ / epigastric pain   r/o biliary colic (more likely) vs gastritis vs pud  f/u LFTs   currently asymptomatic   CBD increased from 3.4mm in 2013 to 7 mm  recommend MRCP   surgery consult for [facility]Y

## 2018-09-02 NOTE — ED PEDIATRIC NURSE REASSESSMENT NOTE - NS ED NURSE REASSESS COMMENT FT2
pt stated she had to leave and take care of family and to contact her at phone number [phone] to come back for discharge paperwork MD made aware.

## 2018-09-02 NOTE — H&P ADULT - HISTORY OF PRESENT ILLNESS
This patient is 27 y/o female with no significant past medical history and surgical history significant for umblical hernia repair. She is admitted in hospital with chief complaint of abdominal pain that started last night. As per patient pain started around umbilical region. It then migrated to epigastric region and then radiated to her back and right shoulder. It was associated with 1 episode of vomiting. After vomiting her pain subsided.   Patient denies chest pain, shortness of breath, diarrhea, constipation, fevers, rash, recent travel and trauma.     ED vitals were all within normal range  In ED ultrasound of abdomen was done which showed cholelithiasis without cholecystitis. Also to note was CBD dilatation of 7mm.

## 2018-09-02 NOTE — H&P ADULT - ATTENDING COMMENTS
27 y/o female with surgical history significant for umbilical hernia repair presented with abdominal pain and vomiting. She was found to have cholelithiasis with CBD dilation. She has no evidence of cholestatic liver injury or hyperbilirubinemia. Appreciated GI recommendation for MRCP.

## 2018-09-02 NOTE — H&P ADULT - ASSESSMENT
This patient is 29 y/o female with no significant past medical history and surgical history significant for umblical hernia repair. She is admitted in hospital with chief complaint of abdominal pain that started last night. USG found cholelithiasis without cholecystitis.     Assessment and plan     1- Cholelithiasis and CBD dilation   - Surgery on board: GI consult for possible MRCP    - Will place GI consult   - Pain control tramadol PRN- Zofran for nausea   - NPO for now in case surgery wants to do cholecystectomy     Encourage ambulation   Fully functional from home

## 2018-09-02 NOTE — ED ADULT NURSE REASSESSMENT NOTE - NS ED NURSE REASSESS COMMENT FT1
pt states she would like to leave [place] due to the fact that she has two children at home. pt denies any pain at this time ambulatory with a steady gait MD contacted at [phone].

## 2018-09-03 LAB
CULTURE RESULTS: SIGNIFICANT CHANGE UP
SPECIMEN SOURCE: SIGNIFICANT CHANGE UP

## 2018-09-06 DIAGNOSIS — K83.9 DISEASE OF BILIARY TRACT, UNSPECIFIED: ICD-10-CM

## 2018-09-06 DIAGNOSIS — K83.8 OTHER SPECIFIED DISEASES OF BILIARY TRACT: ICD-10-CM

## 2018-09-06 DIAGNOSIS — K80.20 CALCULUS OF GALLBLADDER WITHOUT CHOLECYSTITIS WITHOUT OBSTRUCTION: ICD-10-CM

## 2018-10-01 ENCOUNTER — APPOINTMENT (OUTPATIENT)
Dept: SURGERY | Facility: CLINIC | Age: 28
End: 2018-10-01

## 2018-10-29 ENCOUNTER — EMERGENCY (EMERGENCY)
Facility: HOSPITAL | Age: 28
LOS: 1 days | Discharge: HOME | End: 2018-10-29
Admitting: PHYSICIAN ASSISTANT

## 2018-10-29 VITALS
RESPIRATION RATE: 18 BRPM | OXYGEN SATURATION: 99 % | TEMPERATURE: 99 F | HEART RATE: 79 BPM | DIASTOLIC BLOOD PRESSURE: 67 MMHG | SYSTOLIC BLOOD PRESSURE: 103 MMHG

## 2018-10-29 DIAGNOSIS — R51 HEADACHE: ICD-10-CM
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
## 2018-10-29 DIAGNOSIS — Z79.899 OTHER LONG TERM (CURRENT) DRUG THERAPY: ICD-10-CM

## 2018-10-29 DIAGNOSIS — S60.512A ABRASION OF LEFT HAND, INITIAL ENCOUNTER: ICD-10-CM
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
## 2018-10-29 NOTE — ED PROVIDER NOTE - SKIN, MLM
+ abrasion along palmar aspect of left hand ; remainder of skin normal color for race, warm, dry and intact. No evidence of rash.

## 2018-10-29 NOTE — ED PROVIDER NOTE - NSFOLLOWUPINSTRUCTIONS_ED_ALL_ED_FT
Contusion    A contusion is a deep bruise. Contusions are the result of a blunt injury to tissues and muscle fibers under the skin. The skin overlying the contusion may turn blue, purple, or yellow. Symptoms also include pain and swelling in the injured area.    SEEK IMMEDIATE MEDICAL CARE IF YOU HAVE ANY OF THE FOLLOWING SYMPTOMS: severe pain, numbness, tingling, pain, weakness, or skin color/temperature change in any part of your body distal to the injury.

## 2018-10-29 NOTE — ED PROVIDER NOTE - OBJECTIVE STATEMENT
27 y/o F, no significant PMHx, presents to the ED with complaints of facial pain s/p mechanical fall at 0200 this AM. Patient states that she was wearing high-heels this AM and tripped and fell, landing on her face. She denies any LOC and was able to stand up immediately without incident. She denies any nausea, vomiting, neck pain, chest pain, dyspnea, abdominal pain, back pain, cephalgia, and additional injuries. She denies any blood thinner use.

## 2019-02-10 ENCOUNTER — EMERGENCY (EMERGENCY)
Facility: HOSPITAL | Age: 29
LOS: 0 days | Discharge: HOME | End: 2019-02-10
Attending: EMERGENCY MEDICINE | Admitting: EMERGENCY MEDICINE

## 2019-02-10 VITALS
OXYGEN SATURATION: 96 % | SYSTOLIC BLOOD PRESSURE: 120 MMHG | DIASTOLIC BLOOD PRESSURE: 58 MMHG | HEART RATE: 72 BPM | TEMPERATURE: 98 F | RESPIRATION RATE: 18 BRPM

## 2019-02-10 DIAGNOSIS — Z79.899 OTHER LONG TERM (CURRENT) DRUG THERAPY: ICD-10-CM

## 2019-02-10 DIAGNOSIS — Z79.52 LONG TERM (CURRENT) USE OF SYSTEMIC STEROIDS: ICD-10-CM

## 2019-02-10 DIAGNOSIS — R10.9 UNSPECIFIED ABDOMINAL PAIN: ICD-10-CM

## 2019-02-10 DIAGNOSIS — Z79.1 LONG TERM (CURRENT) USE OF NON-STEROIDAL ANTI-INFLAMMATORIES (NSAID): ICD-10-CM

## 2019-02-10 DIAGNOSIS — O26.619 LIVER AND BILIARY TRACT DISORDERS IN PREGNANCY, UNSPECIFIED TRIMESTER: ICD-10-CM

## 2019-02-10 DIAGNOSIS — K80.51 CALCULUS OF BILE DUCT WITHOUT CHOLANGITIS OR CHOLECYSTITIS WITH OBSTRUCTION: ICD-10-CM

## 2019-02-10 LAB
ALBUMIN SERPL ELPH-MCNC: 4.4 G/DL — SIGNIFICANT CHANGE UP (ref 3.5–5.2)
ALP SERPL-CCNC: 55 U/L — SIGNIFICANT CHANGE UP (ref 30–115)
ALT FLD-CCNC: 12 U/L — SIGNIFICANT CHANGE UP (ref 0–41)
ANION GAP SERPL CALC-SCNC: 13 MMOL/L — SIGNIFICANT CHANGE UP (ref 7–14)
APPEARANCE UR: ABNORMAL
APTT BLD: 29.5 SEC — SIGNIFICANT CHANGE UP (ref 27–39.2)
AST SERPL-CCNC: 15 U/L — SIGNIFICANT CHANGE UP (ref 0–41)
BACTERIA # UR AUTO: ABNORMAL /HPF
BASOPHILS # BLD AUTO: 0.04 K/UL — SIGNIFICANT CHANGE UP (ref 0–0.2)
BASOPHILS NFR BLD AUTO: 0.4 % — SIGNIFICANT CHANGE UP (ref 0–1)
BILIRUB SERPL-MCNC: 0.4 MG/DL — SIGNIFICANT CHANGE UP (ref 0.2–1.2)
BILIRUB UR-MCNC: NEGATIVE — SIGNIFICANT CHANGE UP
BLD GP AB SCN SERPL QL: SIGNIFICANT CHANGE UP
BUN SERPL-MCNC: 12 MG/DL — SIGNIFICANT CHANGE UP (ref 10–20)
CALCIUM SERPL-MCNC: 9.1 MG/DL — SIGNIFICANT CHANGE UP (ref 8.5–10.1)
CHLORIDE SERPL-SCNC: 100 MMOL/L — SIGNIFICANT CHANGE UP (ref 98–110)
CO2 SERPL-SCNC: 24 MMOL/L — SIGNIFICANT CHANGE UP (ref 17–32)
COLOR SPEC: YELLOW — SIGNIFICANT CHANGE UP
CREAT SERPL-MCNC: 0.7 MG/DL — SIGNIFICANT CHANGE UP (ref 0.7–1.5)
DIFF PNL FLD: NEGATIVE — SIGNIFICANT CHANGE UP
EOSINOPHIL # BLD AUTO: 0.05 K/UL — SIGNIFICANT CHANGE UP (ref 0–0.7)
EOSINOPHIL NFR BLD AUTO: 0.5 % — SIGNIFICANT CHANGE UP (ref 0–8)
EPI CELLS # UR: ABNORMAL /HPF
GLUCOSE SERPL-MCNC: 95 MG/DL — SIGNIFICANT CHANGE UP (ref 70–99)
GLUCOSE UR QL: NEGATIVE — SIGNIFICANT CHANGE UP
HCG SERPL-ACNC: HIGH MIU/ML
HCT VFR BLD CALC: 37 % — SIGNIFICANT CHANGE UP (ref 37–47)
HGB BLD-MCNC: 12.5 G/DL — SIGNIFICANT CHANGE UP (ref 12–16)
IMM GRANULOCYTES NFR BLD AUTO: 0.4 % — HIGH (ref 0.1–0.3)
INR BLD: 1.25 RATIO — SIGNIFICANT CHANGE UP (ref 0.65–1.3)
KETONES UR-MCNC: NEGATIVE — SIGNIFICANT CHANGE UP
LEUKOCYTE ESTERASE UR-ACNC: NEGATIVE — SIGNIFICANT CHANGE UP
LYMPHOCYTES # BLD AUTO: 2.13 K/UL — SIGNIFICANT CHANGE UP (ref 1.2–3.4)
LYMPHOCYTES # BLD AUTO: 23.1 % — SIGNIFICANT CHANGE UP (ref 20.5–51.1)
MCHC RBC-ENTMCNC: 29.8 PG — SIGNIFICANT CHANGE UP (ref 27–31)
MCHC RBC-ENTMCNC: 33.8 G/DL — SIGNIFICANT CHANGE UP (ref 32–37)
MCV RBC AUTO: 88.3 FL — SIGNIFICANT CHANGE UP (ref 81–99)
MONOCYTES # BLD AUTO: 0.59 K/UL — SIGNIFICANT CHANGE UP (ref 0.1–0.6)
MONOCYTES NFR BLD AUTO: 6.4 % — SIGNIFICANT CHANGE UP (ref 1.7–9.3)
NEUTROPHILS # BLD AUTO: 6.39 K/UL — SIGNIFICANT CHANGE UP (ref 1.4–6.5)
NEUTROPHILS NFR BLD AUTO: 69.2 % — SIGNIFICANT CHANGE UP (ref 42.2–75.2)
NITRITE UR-MCNC: NEGATIVE — SIGNIFICANT CHANGE UP
NRBC # BLD: 0 /100 WBCS — SIGNIFICANT CHANGE UP (ref 0–0)
PH UR: 6.5 — SIGNIFICANT CHANGE UP (ref 5–8)
PLATELET # BLD AUTO: 213 K/UL — SIGNIFICANT CHANGE UP (ref 130–400)
POTASSIUM SERPL-MCNC: 4.2 MMOL/L — SIGNIFICANT CHANGE UP (ref 3.5–5)
POTASSIUM SERPL-SCNC: 4.2 MMOL/L — SIGNIFICANT CHANGE UP (ref 3.5–5)
PROT SERPL-MCNC: 6.7 G/DL — SIGNIFICANT CHANGE UP (ref 6–8)
PROT UR-MCNC: NEGATIVE — SIGNIFICANT CHANGE UP
PROTHROM AB SERPL-ACNC: 14.4 SEC — HIGH (ref 9.95–12.87)
RBC # BLD: 4.19 M/UL — LOW (ref 4.2–5.4)
RBC # FLD: 12.9 % — SIGNIFICANT CHANGE UP (ref 11.5–14.5)
RBC CASTS # UR COMP ASSIST: SIGNIFICANT CHANGE UP /HPF
SODIUM SERPL-SCNC: 137 MMOL/L — SIGNIFICANT CHANGE UP (ref 135–146)
SP GR SPEC: >=1.03 — SIGNIFICANT CHANGE UP (ref 1.01–1.03)
TYPE + AB SCN PNL BLD: SIGNIFICANT CHANGE UP
UROBILINOGEN FLD QL: 1 (ref 0.2–0.2)
WBC # BLD: 9.24 K/UL — SIGNIFICANT CHANGE UP (ref 4.8–10.8)
WBC # FLD AUTO: 9.24 K/UL — SIGNIFICANT CHANGE UP (ref 4.8–10.8)
WBC UR QL: SIGNIFICANT CHANGE UP /HPF

## 2019-02-10 RX ORDER — ACETAMINOPHEN 500 MG
650 TABLET ORAL ONCE
Qty: 0 | Refills: 0 | Status: COMPLETED | OUTPATIENT
Start: 2019-02-10 | End: 2019-02-10

## 2019-02-10 RX ORDER — KETOROLAC TROMETHAMINE 30 MG/ML
15 SYRINGE (ML) INJECTION ONCE
Qty: 0 | Refills: 0 | Status: DISCONTINUED | OUTPATIENT
Start: 2019-02-10 | End: 2019-02-10

## 2019-02-10 RX ORDER — SODIUM CHLORIDE 9 MG/ML
1000 INJECTION, SOLUTION INTRAVENOUS ONCE
Qty: 0 | Refills: 0 | Status: COMPLETED | OUTPATIENT
Start: 2019-02-10 | End: 2019-02-10

## 2019-02-10 RX ORDER — ONDANSETRON 8 MG/1
4 TABLET, FILM COATED ORAL ONCE
Qty: 0 | Refills: 0 | Status: COMPLETED | OUTPATIENT
Start: 2019-02-10 | End: 2019-02-10

## 2019-02-10 RX ORDER — METOCLOPRAMIDE HCL 10 MG
10 TABLET ORAL ONCE
Qty: 0 | Refills: 0 | Status: COMPLETED | OUTPATIENT
Start: 2019-02-10 | End: 2019-02-10

## 2019-02-10 RX ADMIN — Medication 650 MILLIGRAM(S): at 12:15

## 2019-02-10 RX ADMIN — Medication 15 MILLIGRAM(S): at 10:45

## 2019-02-10 RX ADMIN — Medication 10 MILLIGRAM(S): at 12:15

## 2019-02-10 RX ADMIN — ONDANSETRON 4 MILLIGRAM(S): 8 TABLET, FILM COATED ORAL at 11:27

## 2019-02-10 RX ADMIN — SODIUM CHLORIDE 1000 MILLILITER(S): 9 INJECTION, SOLUTION INTRAVENOUS at 10:45

## 2019-02-10 NOTE — ED PROVIDER NOTE - CLINICAL SUMMARY MEDICAL DECISION MAKING FREE TEXT BOX
pt informed of gallstones and need for outpt f/u surgery clinic and f/u w her ob this week dr Mitchell, diet modification and PNV advised, +IUP on sono, strict return precautions provided. pt tolreating po in ED, abd soft ntnd. pain improved.

## 2019-02-10 NOTE — ED PROVIDER NOTE - OBJECTIVE STATEMENT
29F with pmh of ectopic pregnancy presents with RUQ abd pain, intermittent since yesterday. +vomiting, nbnb. Denies fever, chills, CP, SOB, urinary sx, dietary aggravation of sx.

## 2019-02-10 NOTE — ED PROVIDER NOTE - PROGRESS NOTE DETAILS
Upreg+, beta HCG, coags, and type and cross ordered IUP and cholelithiasis seen on US. PO challenged, no vomiting.

## 2019-02-10 NOTE — ED PROVIDER NOTE - ATTENDING CONTRIBUTION TO CARE
29F no pmh, , prior ectopic, didn't know she was preg, p/w acute onset RUQ pain that radiates to R shoulder assoc w nbnb emesis x2. intermittent sharp pain. no d/c. no fever. woke her up this morning. no cp, sob. no cough. no dysuria, freq, hematuria. no vag bleeding, dc. lmp . s/p umb hernia repair. no birth control.     on exam, AFVSS, well beronica nad, ncat, eomi, perrla, mmm, lctab, rrr nl s1s2 no mrg, abd soft mild ruq ttp, no rebound or rigidity, no cvat, nd, aaox3, no focal deficits, no le edema or calf ttp,     a/p; concern for biliary colic, r/o cholecystitis, didn't know she was pregnant, r/o ectopic, will do labs, ua, sono, re-eval

## 2019-02-10 NOTE — ED PROVIDER NOTE - CARE PROVIDER_API CALL
Tahmina Hernandez (DO)  Obstetrics and Gynecology  [address] [facility], [address] [place] [address]  Phone: [phone]  Fax: [phone]  Follow Up Time:

## 2019-02-10 NOTE — ED PROVIDER NOTE - PHYSICAL EXAMINATION
CONSTITUTIONAL: Well-developed; well-nourished; in no acute distress.   SKIN: warm, dry  HEAD: Normocephalic; atraumatic.  EYES: PERRL, EOMI, no conjunctival erythema  ENT: No nasal discharge; airway clear.  NECK: Supple; non tender.  CARD: S1, S2 normal; no murmurs, gallops, or rubs. Regular rate and rhythm.   RESP: No wheezes, rales or rhonchi.  ABD: soft, TTP in RUQ, no peritoneals signs.  EXT: Normal ROM.  No clubbing, cyanosis or edema.   LYMPH: No acute cervical adenopathy.  NEURO: Alert, oriented, grossly unremarkable  PSYCH: Cooperative, appropriate.

## 2019-02-10 NOTE — ED PROVIDER NOTE - NSFOLLOWUPINSTRUCTIONS_ED_ALL_ED_FT
Gallstones    Gallstones (cholelithiasis) is a form of gallbladder disease in which stones form in your gallbladder. The gallbladder is an organ that stores bile made in the liver, which helps digest fats. Gallstones begin as small bile crystals and slowly grow into stones. Gallstone pain occurs when the gallbladder spasms and a gallstone or sludge is blocking the duct. Pain can also occur when a stone passes out of the duct. Only take over-the-counter or prescription medicines for pain, discomfort, or fever as directed by your health care provider. Follow a low-fat diet until seen again by your health care provider.     SEEK IMMEDIATE MEDICAL CARE IF YOU HAVE ANY OF THE FOLLOWING SYMPTOMS: worsening pain, fever, persistent vomiting, yellowing of the skin or eyes, or altered mental status.

## 2019-02-10 NOTE — ED PROVIDER NOTE - NS ED ROS FT
Eyes:  No visual changes, eye pain or discharge.  ENMT:  No hearing changes, pain, no sore throat or runny nose, no difficulty swallowing  Cardiac:  No chest pain, SOB or edema. No chest pain with exertion.  Respiratory:  No cough or respiratory distress. No hemoptysis. No history of asthma or RAD.  GI:  + nausea, + vomiting, - diarrhea + abdominal pain.  :  No dysuria, frequency or burning.  MS:  No myalgia, muscle weakness, joint pain or back pain.  Neuro:  No headache or weakness.  No LOC.  Skin:  No skin rash.   Endocrine: No history of thyroid disease or diabetes.

## 2019-02-13 ENCOUNTER — INPATIENT (INPATIENT)
Facility: HOSPITAL | Age: 29
LOS: 1 days | Discharge: HOME | End: 2019-02-15
Attending: SURGERY | Admitting: SURGERY
Payer: MEDICAID

## 2019-02-13 VITALS
HEART RATE: 71 BPM | TEMPERATURE: 99 F | SYSTOLIC BLOOD PRESSURE: 110 MMHG | RESPIRATION RATE: 16 BRPM | OXYGEN SATURATION: 98 % | DIASTOLIC BLOOD PRESSURE: 58 MMHG

## 2019-02-13 DIAGNOSIS — O99.611 DISEASES OF THE DIGESTIVE SYSTEM COMPLICATING PREGNANCY, FIRST TRIMESTER: ICD-10-CM

## 2019-02-13 DIAGNOSIS — R10.9 UNSPECIFIED ABDOMINAL PAIN: ICD-10-CM

## 2019-02-13 DIAGNOSIS — K80.10 CALCULUS OF GALLBLADDER WITH CHRONIC CHOLECYSTITIS WITHOUT OBSTRUCTION: ICD-10-CM

## 2019-02-13 DIAGNOSIS — Z3A.01 LESS THAN 8 WEEKS GESTATION OF PREGNANCY: ICD-10-CM

## 2019-02-13 LAB
ALBUMIN SERPL ELPH-MCNC: 4.5 G/DL — SIGNIFICANT CHANGE UP (ref 3.5–5.2)
ALP SERPL-CCNC: 53 U/L — SIGNIFICANT CHANGE UP (ref 30–115)
ALT FLD-CCNC: 12 U/L — SIGNIFICANT CHANGE UP (ref 0–41)
ANION GAP SERPL CALC-SCNC: 23 MMOL/L — HIGH (ref 7–14)
AST SERPL-CCNC: 31 U/L — SIGNIFICANT CHANGE UP (ref 0–41)
BASOPHILS # BLD AUTO: 0.03 K/UL — SIGNIFICANT CHANGE UP (ref 0–0.2)
BASOPHILS NFR BLD AUTO: 0.2 % — SIGNIFICANT CHANGE UP (ref 0–1)
BILIRUB DIRECT SERPL-MCNC: <0.2 MG/DL — SIGNIFICANT CHANGE UP (ref 0–0.2)
BILIRUB INDIRECT FLD-MCNC: >0.2 MG/DL — SIGNIFICANT CHANGE UP (ref 0.2–1.2)
BILIRUB SERPL-MCNC: 0.4 MG/DL — SIGNIFICANT CHANGE UP (ref 0.2–1.2)
BUN SERPL-MCNC: 13 MG/DL — SIGNIFICANT CHANGE UP (ref 10–20)
CALCIUM SERPL-MCNC: 9.6 MG/DL — SIGNIFICANT CHANGE UP (ref 8.5–10.1)
CHLORIDE SERPL-SCNC: 99 MMOL/L — SIGNIFICANT CHANGE UP (ref 98–110)
CO2 SERPL-SCNC: 17 MMOL/L — SIGNIFICANT CHANGE UP (ref 17–32)
CREAT SERPL-MCNC: 0.8 MG/DL — SIGNIFICANT CHANGE UP (ref 0.7–1.5)
EOSINOPHIL # BLD AUTO: 0.01 K/UL — SIGNIFICANT CHANGE UP (ref 0–0.7)
EOSINOPHIL NFR BLD AUTO: 0.1 % — SIGNIFICANT CHANGE UP (ref 0–8)
GLUCOSE SERPL-MCNC: 95 MG/DL — SIGNIFICANT CHANGE UP (ref 70–99)
HCG SERPL-ACNC: HIGH MIU/ML
HCT VFR BLD CALC: 36.6 % — LOW (ref 37–47)
HGB BLD-MCNC: 12.6 G/DL — SIGNIFICANT CHANGE UP (ref 12–16)
IMM GRANULOCYTES NFR BLD AUTO: 0.3 % — SIGNIFICANT CHANGE UP (ref 0.1–0.3)
LIDOCAIN IGE QN: 27 U/L — SIGNIFICANT CHANGE UP (ref 7–60)
LYMPHOCYTES # BLD AUTO: 1.77 K/UL — SIGNIFICANT CHANGE UP (ref 1.2–3.4)
LYMPHOCYTES # BLD AUTO: 13 % — LOW (ref 20.5–51.1)
MCHC RBC-ENTMCNC: 30.1 PG — SIGNIFICANT CHANGE UP (ref 27–31)
MCHC RBC-ENTMCNC: 34.4 G/DL — SIGNIFICANT CHANGE UP (ref 32–37)
MCV RBC AUTO: 87.4 FL — SIGNIFICANT CHANGE UP (ref 81–99)
MONOCYTES # BLD AUTO: 0.88 K/UL — HIGH (ref 0.1–0.6)
MONOCYTES NFR BLD AUTO: 6.5 % — SIGNIFICANT CHANGE UP (ref 1.7–9.3)
NEUTROPHILS # BLD AUTO: 10.84 K/UL — HIGH (ref 1.4–6.5)
NEUTROPHILS NFR BLD AUTO: 79.9 % — HIGH (ref 42.2–75.2)
NRBC # BLD: 0 /100 WBCS — SIGNIFICANT CHANGE UP (ref 0–0)
PLATELET # BLD AUTO: 230 K/UL — SIGNIFICANT CHANGE UP (ref 130–400)
POTASSIUM SERPL-MCNC: 5.4 MMOL/L — HIGH (ref 3.5–5)
POTASSIUM SERPL-SCNC: 5.4 MMOL/L — HIGH (ref 3.5–5)
PROT SERPL-MCNC: 7.2 G/DL — SIGNIFICANT CHANGE UP (ref 6–8)
RBC # BLD: 4.19 M/UL — LOW (ref 4.2–5.4)
RBC # FLD: 12.8 % — SIGNIFICANT CHANGE UP (ref 11.5–14.5)
SODIUM SERPL-SCNC: 139 MMOL/L — SIGNIFICANT CHANGE UP (ref 135–146)
WBC # BLD: 13.57 K/UL — HIGH (ref 4.8–10.8)
WBC # FLD AUTO: 13.57 K/UL — HIGH (ref 4.8–10.8)

## 2019-02-13 RX ORDER — ONDANSETRON 8 MG/1
4 TABLET, FILM COATED ORAL ONCE
Qty: 0 | Refills: 0 | Status: COMPLETED | OUTPATIENT
Start: 2019-02-13 | End: 2019-02-13

## 2019-02-13 RX ORDER — FAMOTIDINE 10 MG/ML
20 INJECTION INTRAVENOUS ONCE
Qty: 0 | Refills: 0 | Status: COMPLETED | OUTPATIENT
Start: 2019-02-13 | End: 2019-02-13

## 2019-02-13 RX ORDER — SODIUM CHLORIDE 9 MG/ML
1000 INJECTION INTRAMUSCULAR; INTRAVENOUS; SUBCUTANEOUS ONCE
Qty: 0 | Refills: 0 | Status: COMPLETED | OUTPATIENT
Start: 2019-02-13 | End: 2019-02-13

## 2019-02-13 RX ADMIN — SODIUM CHLORIDE 1000 MILLILITER(S): 9 INJECTION INTRAMUSCULAR; INTRAVENOUS; SUBCUTANEOUS at 22:00

## 2019-02-13 RX ADMIN — ONDANSETRON 4 MILLIGRAM(S): 8 TABLET, FILM COATED ORAL at 22:00

## 2019-02-13 RX ADMIN — FAMOTIDINE 20 MILLIGRAM(S): 10 INJECTION INTRAVENOUS at 22:00

## 2019-02-14 ENCOUNTER — RESULT REVIEW (OUTPATIENT)
Age: 29
End: 2019-02-14

## 2019-02-14 LAB
ANION GAP SERPL CALC-SCNC: 15 MMOL/L — HIGH (ref 7–14)
APPEARANCE UR: ABNORMAL
BACTERIA # UR AUTO: ABNORMAL /HPF
BILIRUB UR-MCNC: ABNORMAL
BUN SERPL-MCNC: 10 MG/DL — SIGNIFICANT CHANGE UP (ref 10–20)
CALCIUM SERPL-MCNC: 8.5 MG/DL — SIGNIFICANT CHANGE UP (ref 8.5–10.1)
CHLORIDE SERPL-SCNC: 103 MMOL/L — SIGNIFICANT CHANGE UP (ref 98–110)
CO2 SERPL-SCNC: 22 MMOL/L — SIGNIFICANT CHANGE UP (ref 17–32)
COLOR SPEC: SIGNIFICANT CHANGE UP
COMMENT - URINE: SIGNIFICANT CHANGE UP
CREAT SERPL-MCNC: 0.7 MG/DL — SIGNIFICANT CHANGE UP (ref 0.7–1.5)
DIFF PNL FLD: NEGATIVE — SIGNIFICANT CHANGE UP
EPI CELLS # UR: ABNORMAL /HPF
GLUCOSE SERPL-MCNC: 84 MG/DL — SIGNIFICANT CHANGE UP (ref 70–99)
GLUCOSE UR QL: NEGATIVE MG/DL — SIGNIFICANT CHANGE UP
KETONES UR-MCNC: >=80
LEUKOCYTE ESTERASE UR-ACNC: NEGATIVE — SIGNIFICANT CHANGE UP
NITRITE UR-MCNC: NEGATIVE — SIGNIFICANT CHANGE UP
PH UR: 6 — SIGNIFICANT CHANGE UP (ref 5–8)
POTASSIUM SERPL-MCNC: 3.9 MMOL/L — SIGNIFICANT CHANGE UP (ref 3.5–5)
POTASSIUM SERPL-SCNC: 3.9 MMOL/L — SIGNIFICANT CHANGE UP (ref 3.5–5)
PROT UR-MCNC: 30 MG/DL
RBC CASTS # UR COMP ASSIST: SIGNIFICANT CHANGE UP /HPF
SODIUM SERPL-SCNC: 140 MMOL/L — SIGNIFICANT CHANGE UP (ref 135–146)
SP GR SPEC: 1.02 — SIGNIFICANT CHANGE UP (ref 1.01–1.03)
UROBILINOGEN FLD QL: 1 MG/DL (ref 0.2–0.2)

## 2019-02-14 PROCEDURE — 47562 LAPAROSCOPIC CHOLECYSTECTOMY: CPT

## 2019-02-14 PROCEDURE — 99223 1ST HOSP IP/OBS HIGH 75: CPT | Mod: 57

## 2019-02-14 RX ORDER — AMPICILLIN SODIUM AND SULBACTAM SODIUM 250; 125 MG/ML; MG/ML
3 INJECTION, POWDER, FOR SUSPENSION INTRAMUSCULAR; INTRAVENOUS EVERY 6 HOURS
Qty: 0 | Refills: 0 | Status: DISCONTINUED | OUTPATIENT
Start: 2019-02-14 | End: 2019-02-14

## 2019-02-14 RX ORDER — METOCLOPRAMIDE HCL 10 MG
10 TABLET ORAL ONCE
Qty: 0 | Refills: 0 | Status: COMPLETED | OUTPATIENT
Start: 2019-02-14 | End: 2019-02-14

## 2019-02-14 RX ORDER — HYDROMORPHONE HYDROCHLORIDE 2 MG/ML
1 INJECTION INTRAMUSCULAR; INTRAVENOUS; SUBCUTANEOUS
Qty: 0 | Refills: 0 | Status: DISCONTINUED | OUTPATIENT
Start: 2019-02-14 | End: 2019-02-14

## 2019-02-14 RX ORDER — AMPICILLIN SODIUM AND SULBACTAM SODIUM 250; 125 MG/ML; MG/ML
INJECTION, POWDER, FOR SUSPENSION INTRAMUSCULAR; INTRAVENOUS
Qty: 0 | Refills: 0 | Status: DISCONTINUED | OUTPATIENT
Start: 2019-02-14 | End: 2019-02-14

## 2019-02-14 RX ORDER — ACETAMINOPHEN 500 MG
650 TABLET ORAL EVERY 4 HOURS
Qty: 0 | Refills: 0 | Status: DISCONTINUED | OUTPATIENT
Start: 2019-02-14 | End: 2019-02-15

## 2019-02-14 RX ORDER — ONDANSETRON 8 MG/1
4 TABLET, FILM COATED ORAL EVERY 6 HOURS
Qty: 0 | Refills: 0 | Status: DISCONTINUED | OUTPATIENT
Start: 2019-02-14 | End: 2019-02-14

## 2019-02-14 RX ORDER — HEPARIN SODIUM 5000 [USP'U]/ML
5000 INJECTION INTRAVENOUS; SUBCUTANEOUS EVERY 8 HOURS
Qty: 0 | Refills: 0 | Status: DISCONTINUED | OUTPATIENT
Start: 2019-02-14 | End: 2019-02-15

## 2019-02-14 RX ORDER — SODIUM CHLORIDE 9 MG/ML
1000 INJECTION INTRAMUSCULAR; INTRAVENOUS; SUBCUTANEOUS
Qty: 0 | Refills: 0 | Status: DISCONTINUED | OUTPATIENT
Start: 2019-02-14 | End: 2019-02-14

## 2019-02-14 RX ORDER — PROCHLORPERAZINE MALEATE 5 MG
10 TABLET ORAL ONCE
Qty: 0 | Refills: 0 | Status: DISCONTINUED | OUTPATIENT
Start: 2019-02-14 | End: 2019-02-15

## 2019-02-14 RX ORDER — SODIUM CHLORIDE 9 MG/ML
1000 INJECTION, SOLUTION INTRAVENOUS
Qty: 0 | Refills: 0 | Status: DISCONTINUED | OUTPATIENT
Start: 2019-02-14 | End: 2019-02-15

## 2019-02-14 RX ORDER — AMPICILLIN SODIUM AND SULBACTAM SODIUM 250; 125 MG/ML; MG/ML
3 INJECTION, POWDER, FOR SUSPENSION INTRAMUSCULAR; INTRAVENOUS ONCE
Qty: 0 | Refills: 0 | Status: COMPLETED | OUTPATIENT
Start: 2019-02-14 | End: 2019-02-14

## 2019-02-14 RX ORDER — MEPERIDINE HYDROCHLORIDE 50 MG/ML
12.5 INJECTION INTRAMUSCULAR; INTRAVENOUS; SUBCUTANEOUS
Qty: 0 | Refills: 0 | Status: DISCONTINUED | OUTPATIENT
Start: 2019-02-14 | End: 2019-02-14

## 2019-02-14 RX ORDER — IBUPROFEN 200 MG
400 TABLET ORAL EVERY 6 HOURS
Qty: 0 | Refills: 0 | Status: DISCONTINUED | OUTPATIENT
Start: 2019-02-14 | End: 2019-02-15

## 2019-02-14 RX ORDER — ONDANSETRON 8 MG/1
4 TABLET, FILM COATED ORAL ONCE
Qty: 0 | Refills: 0 | Status: COMPLETED | OUTPATIENT
Start: 2019-02-14 | End: 2019-02-14

## 2019-02-14 RX ORDER — HYDROMORPHONE HYDROCHLORIDE 2 MG/ML
0.5 INJECTION INTRAMUSCULAR; INTRAVENOUS; SUBCUTANEOUS
Qty: 0 | Refills: 0 | Status: DISCONTINUED | OUTPATIENT
Start: 2019-02-14 | End: 2019-02-14

## 2019-02-14 RX ORDER — SODIUM CHLORIDE 9 MG/ML
1000 INJECTION, SOLUTION INTRAVENOUS
Qty: 0 | Refills: 0 | Status: DISCONTINUED | OUTPATIENT
Start: 2019-02-14 | End: 2019-02-14

## 2019-02-14 RX ORDER — FAMOTIDINE 10 MG/ML
20 INJECTION INTRAVENOUS EVERY 12 HOURS
Qty: 0 | Refills: 0 | Status: DISCONTINUED | OUTPATIENT
Start: 2019-02-14 | End: 2019-02-15

## 2019-02-14 RX ORDER — INFLUENZA VIRUS VACCINE 15; 15; 15; 15 UG/.5ML; UG/.5ML; UG/.5ML; UG/.5ML
0.5 SUSPENSION INTRAMUSCULAR ONCE
Qty: 0 | Refills: 0 | Status: COMPLETED | OUTPATIENT
Start: 2019-02-14 | End: 2019-02-14

## 2019-02-14 RX ORDER — ONDANSETRON 8 MG/1
4 TABLET, FILM COATED ORAL EVERY 6 HOURS
Qty: 0 | Refills: 0 | Status: DISCONTINUED | OUTPATIENT
Start: 2019-02-14 | End: 2019-02-15

## 2019-02-14 RX ADMIN — HYDROMORPHONE HYDROCHLORIDE 0.5 MILLIGRAM(S): 2 INJECTION INTRAMUSCULAR; INTRAVENOUS; SUBCUTANEOUS at 22:32

## 2019-02-14 RX ADMIN — ONDANSETRON 4 MILLIGRAM(S): 8 TABLET, FILM COATED ORAL at 17:24

## 2019-02-14 RX ADMIN — ONDANSETRON 4 MILLIGRAM(S): 8 TABLET, FILM COATED ORAL at 22:34

## 2019-02-14 RX ADMIN — HEPARIN SODIUM 5000 UNIT(S): 5000 INJECTION INTRAVENOUS; SUBCUTANEOUS at 22:14

## 2019-02-14 RX ADMIN — SODIUM CHLORIDE 150 MILLILITER(S): 9 INJECTION, SOLUTION INTRAVENOUS at 22:06

## 2019-02-14 RX ADMIN — ONDANSETRON 4 MILLIGRAM(S): 8 TABLET, FILM COATED ORAL at 10:48

## 2019-02-14 RX ADMIN — SODIUM CHLORIDE 100 MILLILITER(S): 9 INJECTION INTRAMUSCULAR; INTRAVENOUS; SUBCUTANEOUS at 10:53

## 2019-02-14 RX ADMIN — SODIUM CHLORIDE 100 MILLILITER(S): 9 INJECTION INTRAMUSCULAR; INTRAVENOUS; SUBCUTANEOUS at 04:14

## 2019-02-14 RX ADMIN — SODIUM CHLORIDE 100 MILLILITER(S): 9 INJECTION INTRAMUSCULAR; INTRAVENOUS; SUBCUTANEOUS at 06:48

## 2019-02-14 RX ADMIN — AMPICILLIN SODIUM AND SULBACTAM SODIUM 200 GRAM(S): 250; 125 INJECTION, POWDER, FOR SUSPENSION INTRAMUSCULAR; INTRAVENOUS at 11:17

## 2019-02-14 RX ADMIN — Medication 10 MILLIGRAM(S): at 02:38

## 2019-02-14 RX ADMIN — HYDROMORPHONE HYDROCHLORIDE 0.5 MILLIGRAM(S): 2 INJECTION INTRAMUSCULAR; INTRAVENOUS; SUBCUTANEOUS at 22:10

## 2019-02-14 RX ADMIN — AMPICILLIN SODIUM AND SULBACTAM SODIUM 200 GRAM(S): 250; 125 INJECTION, POWDER, FOR SUSPENSION INTRAMUSCULAR; INTRAVENOUS at 04:41

## 2019-02-14 NOTE — CONSULT NOTE ADULT - SUBJECTIVE AND OBJECTIVE BOX
General Surgery Consultation Note  =====================================================  HPI: 29y Female presents after recent ED on 2/10 where she was diagnosed with biliary colic and pregnancy. Surgery not consulted at that time. No other significant PMH. Today c/o continued RUQ abdominal pain, n/v since 2/10. Pt states that the pain radiates from her RUQ through her ribs to her right back and right shoulder. No aggregating or relieving factors. Not associated with food. No diarrhea.       PAST MEDICAL & SURGICAL HISTORY:  No pertinent past medical history  No significant past surgical history    Home Meds: None    Allergies:    No Known Allergies      ROS:    REVIEW OF SYSTEMS    [x] A ten-point review of systems was otherwise negative except as noted.  [ ] Due to altered mental status/intubation, subjective information were not able to be obtained from the patient. History was obtained, to the extent possible, from review of the chart and collateral sources of information.    --------------------------------------------------------------------------------------    VITAL SIGNS, INS/OUTS (last 24 hours):  --------------------------------------------------------------------------------------  ICU Vital Signs Last 24 Hrs  T(C): 37.1 (2019 23:47), Max: 37.3 (2019 19:23)  T(F): 98.7 (2019 23:47), Max: 99.1 (2019 19:23)  HR: 68 (2019 23:47) (68 - 71)  BP: 94/53 (2019 23:47) (94/53 - 110/58)  BP(mean): --  ABP: --  ABP(mean): --  RR: 18 (2019 23:47) (16 - 18)  SpO2: 99% (2019 23:47) (98% - 99%)    I&O's Summary    --------------------------------------------------------------------------------------  PHYSICAL EXAM    General: NAD, AAOx3  HEENT: NCAT, RENETTA, EOMI  Cardiac: RRR S1, S2  Respiratory: CTAB, normal respiratory effort  Abdomen: Soft, non-distended, RUQ tender  Musculoskeletal: Strength 5/5 BL UE/LE, ROM intact, compartments soft  Neuro: Sensation grossly intact and equal throughout, CN II-XII intact, no focal deficits  Vascular: Pulses 2+ throughout, extremities well perfused  Skin: Warm/dry, normal color, no jaundice  Incision/wound: healing well, dressings in place, clean, dry and intact    LABS  --------------------------------------------------------------------------------------  CAPILLARY BLOOD GLUCOSE                            12.6   13.57 )-----------( 230      ( 2019 21:50 )             36.6       Auto Neutrophil %: 79.9 % (19 @ 21:50)  Auto Immature Granulocyte %: 0.3 % (19 @ 21:50)        139  |  99  |  13  ----------------------------<  95  5.4<H>   |  17  |  0.8      Calcium, Total Serum: 9.6 mg/dL (19 @ 21:50)      LFTs:             7.2  | 0.4  | 31       ------------------[53      ( 2019 21:50 )  4.5  | <0.2 | 12          Lipase:27     Amylase:x             Coags:      Urinalysis Basic - ( 2019 21:05 )    Color: Dark Yellow / Appearance: Cloudy / S.025 / pH: x  Gluc: x / Ketone: >=80  / Bili: Small / Urobili: 1.0 mg/dL   Blood: x / Protein: 30 mg/dL / Nitrite: Negative   Leuk Esterase: Negative / RBC: 1-2 /HPF / WBC x   Sq Epi: x / Non Sq Epi: Few /HPF / Bacteria: Moderate /HPF      --------------------------------------------------------------------------------------    OTHER LABS    IMAGING RESULTS    < from: US Abdomen Limited (19 @ 22:11) >  Cholelithiasis without sonographic evidence of acute cholecystitis.     < end of copied text >        --------------------------------------------------------------------------------------- General Surgery Consultation Note  =====================================================  HPI: 29y Female presents after recent ED on 2/10 where she was diagnosed with biliary colic and pregnancy. Surgery not consulted at that time. No other significant PMH. Today c/o continued RUQ abdominal pain, n/v since 2/10. Pt states that the pain radiates from her RUQ through her ribs to her right back and right shoulder. No aggregating or relieving factors. Not associated with food. No diarrhea.       PAST MEDICAL & SURGICAL HISTORY:  No pertinent past medical history  No significant past surgical history    Home Meds: None    Allergies:    No Known Allergies      ROS:    REVIEW OF SYSTEMS    [x] A ten-point review of systems was otherwise negative except as noted.  [ ] Due to altered mental status/intubation, subjective information were not able to be obtained from the patient. History was obtained, to the extent possible, from review of the chart and collateral sources of information.    --------------------------------------------------------------------------------------    VITAL SIGNS, INS/OUTS (last 24 hours):  --------------------------------------------------------------------------------------  ICU Vital Signs Last 24 Hrs  T(C): 37.1 (2019 23:47), Max: 37.3 (2019 19:23)  T(F): 98.7 (2019 23:47), Max: 99.1 (2019 19:23)  HR: 68 (2019 23:47) (68 - 71)  BP: 94/53 (2019 23:47) (94/53 - 110/58)  RR: 18 (2019 23:47) (16 - 18)  SpO2: 99% (2019 23:47) (98% - 99%)    I&O's Summary    --------------------------------------------------------------------------------------  PHYSICAL EXAM    General: NAD, AAOx3  HEENT: NCAT, RENETTA, EOMI  Cardiac: RRR S1, S2  Respiratory: CTAB, normal respiratory effort  Abdomen: Soft, non-distended, RUQ tender  Musculoskeletal: Strength 5/5 BL UE/LE, ROM intact, compartments soft  Neuro: Sensation grossly intact and equal throughout, CN II-XII intact, no focal deficits  Vascular: Pulses 2+ throughout, extremities well perfused  Skin: Warm/dry, normal color, no jaundice  Incision/wound: healing well, dressings in place, clean, dry and intact    LABS  --------------------------------------------------------------------------------------  CAPILLARY BLOOD GLUCOSE                            12.6   13.57 )-----------( 230      ( 2019 21:50 )             36.6       Auto Neutrophil %: 79.9 % (19 @ 21:50)  Auto Immature Granulocyte %: 0.3 % (19 @ 21:50)        139  |  99  |  13  ----------------------------<  95  5.4<H>   |  17  |  0.8      Calcium, Total Serum: 9.6 mg/dL (19 @ 21:50)      LFTs:             7.2  | 0.4  | 31       ------------------[53      ( 2019 21:50 )  4.5  | <0.2 | 12          Lipase:27     Amylase:x             Coags:      Urinalysis Basic - ( 2019 21:05 )    Color: Dark Yellow / Appearance: Cloudy / S.025 / pH: x  Gluc: x / Ketone: >=80  / Bili: Small / Urobili: 1.0 mg/dL   Blood: x / Protein: 30 mg/dL / Nitrite: Negative   Leuk Esterase: Negative / RBC: 1-2 /HPF / WBC x   Sq Epi: x / Non Sq Epi: Few /HPF / Bacteria: Moderate /HPF      --------------------------------------------------------------------------------------    OTHER LABS    IMAGING RESULTS    < from: US Abdomen Limited (19 @ 22:11) >  Cholelithiasis without sonographic evidence of acute cholecystitis.     < end of copied text >        ---------------------------------------------------------------------------------------

## 2019-02-14 NOTE — CHART NOTE - NSCHARTNOTEFT_GEN_A_CORE
PACU ANESTHESIA ADMISSION NOTE      Procedure:   Post op diagnosis:      ____  Intubated  TV:______       Rate: ______      FiO2: ______    __x__  Patent Airway    _x___  Full return of protective reflexes    ___x_  Full recovery from anesthesia / back to baseline status    Vitals:  T(F):99.1 (02-14-19 @ 21:50), Max: 37.2 (02-14-19 @ 15:50)  HR: 90 (02-14-19 @ 21:50) (68 - 73)  BP: 117/56 (02-14-19 @ 21:50) (94/53 - 109/53)  RR: 18 (02-14-19 @ 21:50) (18 - 20)  SpO2: 99% (02-14-19 @ 21:50) (97% - 99%)    Mental Status:  __x__ Awake   ____x_ Alert   _____ Drowsy   _____ Sedated    Nausea/Vomiting:  __x__ NO  ______Yes,   See Post - Op Orders          Pain Scale (0-10):  ___5__    Treatment: ____ None    ___x_ See Post - Op/PCA Orders    Post - Operative Fluids:   ____ Oral   __x__ See Post - Op Orders    Plan: Discharge:   ____Home       __x___Floor     _____Critical Care    _____  Other:_________________    Comments: Transferred care to PACU; discharge to floor when criteria met.

## 2019-02-14 NOTE — H&P ADULT - HISTORY OF PRESENT ILLNESS
29y Female presents after recent ED on 2/10 where she was diagnosed with biliary colic and pregnancy. Surgery not consulted at that time. No other significant PMH. Today c/o continued RUQ abdominal pain, n/v since 2/10. Pt states that the pain radiates from her RUQ through her ribs to her right back and right shoulder. No aggregating or relieving factors. Not associated with food. No diarrhea.

## 2019-02-14 NOTE — ED PROVIDER NOTE - PROGRESS NOTE DETAILS
Discussed with Dr. yuan, will follow surgical consult and reassess Patient continues to deny chest pain and sob surgery evaluated patient, patient has difficulty to tolerate PO and persistent RUQ pain. recommend IV abx and admit to surgery for further monitor

## 2019-02-14 NOTE — PATIENT PROFILE ADULT - OVER THE PAST TWO WEEKS HAVE YOU FELT DOWN, DEPRESSED OR HOPELESS?
Spoke with patient an inform her if she get the same rash to call the office and schedule an appt no

## 2019-02-14 NOTE — ED ADULT NURSE NOTE - NS ED NURSE DISCH DISPOSITION
I will SWITCH the dose or number of times a day I take the medications listed below when I get home from the hospital:    losartan 100 mg oral tablet  -- 1 tab(s) by mouth once a day    pregabalin 75 mg oral capsule  -- 1 cap(s) by mouth once a day    sertraline 100 mg oral tablet  -- 1 tab(s) by mouth once a day    carvedilol 12.5 mg oral tablet  -- 1 tab(s) by mouth once a day    hydroCHLOROthiazide 25 mg oral tablet  -- 1 tab(s) by mouth once a day    mirtazapine 7.5 mg oral tablet  -- 2 tab(s) by mouth once a day (at bedtime)    Melatonin 3 mg oral tablet  -- 1 tab(s) by mouth once (at bedtime)    zolpidem 5 mg oral tablet  -- 1 tab(s) by mouth once a day (at bedtime)    potassium chloride 10 mEq oral tablet, extended release  -- 1 tab(s) by mouth once a day    celecoxib 200 mg oral capsule  -- 1 cap(s) by mouth once a day    acetaminophen 325 mg oral tablet  -- 1 tab(s) by mouth every 8 hours, As Needed    levothyroxine 25 mcg (0.025 mg) oral capsule  -- 1 cap(s) by mouth once a day    folic acid 1 mg oral tablet  -- 1 tab(s) by mouth once a day    traMADol 50 mg oral tablet  -- 2 tab(s) by mouth 2 times a day, As Needed    meloxicam 15 mg oral tablet  -- 1 tab(s) by mouth once a day    simvastatin 40 mg oral tablet  -- 1 tab(s) by mouth once a day (at bedtime)
Admitted

## 2019-02-14 NOTE — H&P ADULT - NSHPLABSRESULTS_GEN_ALL_CORE
LABS  --------------------------------------------------------------------------------------  CAPILLARY BLOOD GLUCOSE                            12.6   13.57 )-----------( 230      ( 2019 21:50 )             36.6       Auto Neutrophil %: 79.9 % (19 @ 21:50)  Auto Immature Granulocyte %: 0.3 % (19 @ 21:50)        139  |  99  |  13  ----------------------------<  95  5.4<H>   |  17  |  0.8      Calcium, Total Serum: 9.6 mg/dL (19 @ 21:50)      LFTs:             7.2  | 0.4  | 31       ------------------[53      ( 2019 21:50 )  4.5  | <0.2 | 12          Lipase:27     Amylase:x             Coags:      Urinalysis Basic - ( 2019 21:05 )    Color: Dark Yellow / Appearance: Cloudy / S.025 / pH: x  Gluc: x / Ketone: >=80  / Bili: Small / Urobili: 1.0 mg/dL   Blood: x / Protein: 30 mg/dL / Nitrite: Negative   Leuk Esterase: Negative / RBC: 1-2 /HPF / WBC x   Sq Epi: x / Non Sq Epi: Few /HPF / Bacteria: Moderate /HPF      --------------------------------------------------------------------------------------    OTHER LABS    IMAGING RESULTS    < from: US Abdomen Limited (19 @ 22:11) >  Cholelithiasis without sonographic evidence of acute cholecystitis.     < end of copied text >

## 2019-02-14 NOTE — ED PROVIDER NOTE - PHYSICAL EXAMINATION
CONSTITUTIONAL: Well-appearing; well-nourished; in no apparent distress.   EYES: PERRL; EOM intact.   CARDIOVASCULAR: Normal S1, S2; no murmurs, rubs, or gallops.   RESPIRATORY: Normal chest excursion with respiration; breath sounds clear and equal bilaterally; no wheezes, rhonchi, or rales.  GI/: + mild RUQ tenderness. Normal bowel sounds; non-distended; no palpable organomegaly. no CVAT  MS: No evidence of trauma or deformity. Non-tender to palpation.  Normal ROM in all four extremities; non-tender to palpation; distal pulses are normal.   SKIN: Normal for age and race; warm; dry; good turgor; no apparent lesions or exudate.   NEURO/PSYCH: A & O x 4; grossly unremarkable.

## 2019-02-14 NOTE — CONSULT NOTE ADULT - ASSESSMENT
ASSESSMENT:  29y Female with symptomatic cholelithiasis, no evidence of cholecystitis    PLAN:       --------------------------------------------------------------------------------------    02-14-19 @ 03:32 ASSESSMENT:  29y Female with symptomatic cholelithiasis, no evidence of cholecystitis    PLAN:     NPO  IVF  Unasyn  Admit to surg  --------------------------------------------------------------------------------------    02-14-19 @ 03:32 ASSESSMENT:  29y Female with symptomatic cholelithiasis    PLAN:     NPO  IVF  Unasyn  Admit to surg    Senior Resident Note  Pt seen and examined, RUQ pain referred to shoulder, RUQ tenderness no guarding.  presented twice to ED this week, 6 weeks pregnant  admit to surgery, iv unasyn   Above note has been reviewed and edited  Plan d/w patient and Dr. Morales

## 2019-02-14 NOTE — H&P ADULT - ASSESSMENT
ASSESSMENT:  29y Female with symptomatic cholelithiasis    PLAN:     NPO  IVF  Unasyn  Admit to surg    Senior Resident Note  Pt seen and examined, RUQ pain referred to shoulder, RUQ tenderness no guarding.  presented twice to ED this week, 6 weeks pregnant  admit to surgery, iv unasyn   Above note has been reviewed and edited  Plan d/w patient and Dr. Morales

## 2019-02-14 NOTE — ED PROVIDER NOTE - ATTENDING CONTRIBUTION TO CARE
29 year old female, currently at 5 weeks of gestation, comes in with complaint of ruq pain, dull, radiates to the right mid back and + pain in tehrb ight posterior shoulder, no cp/sob, no diarrhea, + nausea, Patients attes pain worsens after food, no loc, no lower pelvic or abdominal pain, no vaginal bleeidng, no trauma.     CONSTITUTIONAL: Well-developed; well-nourished; in no acute distress. Sitting up and providing appropriate history and physical examination  SKIN: skin exam is warm and dry, no acute rash.  HEAD: Normocephalic; atraumatic.  EYES: PERRL, 3 mm bilateral, no nystagmus, EOM intact; conjunctiva and sclera clear.  ENT: No nasal discharge; airway clear.  NECK: Supple; non tender. + full passive ROM in all directions. No JVD  CARD: S1, S2 normal; no murmurs, gallops, or rubs. Regular rate and rhythm. + Symmetric Strong Pulses  RESP: No wheezes, rales or rhonchi. Good air movement bilaterally  ABD: soft; non-distended; + RUQ tender, + [place]. No Rebound, No Guarding, No signs of peritonitis, No CVA tenderness. No pulsatile abdominal mass. + Strong and Symmetric Pulses  EXT: Normal ROM. No clubbing, cyanosis or edema. Dp and Pt Pulses intact. Cap refill less than 3 seconds  NEURO: Alert, oriented, grossly unremarkable. No Focal deficits. GCS 15. NIH 0  PSYCH: Cooperative, appropriate.

## 2019-02-14 NOTE — PRE-ANESTHESIA EVALUATION ADULT - NSANTHPMHFT_GEN_ALL_CORE
6 weeks pregnant, nauseating, zofran given in holding area, obese, not tested for ЮЛИЯ, ex smooker quit >6 weeks ago, 3PY history, lungs clear, no murmur

## 2019-02-14 NOTE — ED PROVIDER NOTE - CLINICAL SUMMARY MEDICAL DECISION MAKING FREE TEXT BOX
30 yo f, 5 weeks preg, with billiary colic on imaging. surgery consulted, labs done, pt could not tolerate po, admitted to surg for pain control, and further mgmt.

## 2019-02-14 NOTE — ED PROVIDER NOTE - OBJECTIVE STATEMENT
30 yo female hx of gallstone present c/o RUQ pain radiating to his right flank x 2 days. pain associates with nausea and multiple NB/NB vomiting.  eating and drinking worsen the pain and nausea. reported she hasn't been able to eat in the past 2 days. denies fever/chill/chest pain/sob/HA/dizziness/urinary sxs/vaginal bleeding and discharge.   patient also found to be 5weeks 3 days pregnant 3 days ago during last ED visit. denies suprapubic pain.

## 2019-02-14 NOTE — ED ADULT NURSE NOTE - NSIMPLEMENTINTERV_GEN_ALL_ED
Implemented All Universal Safety Interventions:  [place] to call system. Call bell, personal items and telephone within reach. Instruct patient to call for assistance. Room bathroom lighting operational. Non-slip footwear when patient is off stretcher. Physically safe environment: no spills, clutter or unnecessary equipment. Stretcher in lowest position, wheels locked, appropriate side rails in place.

## 2019-02-15 ENCOUNTER — TRANSCRIPTION ENCOUNTER (OUTPATIENT)
Age: 29
End: 2019-02-15

## 2019-02-15 VITALS
TEMPERATURE: 97 F | HEART RATE: 89 BPM | RESPIRATION RATE: 18 BRPM | SYSTOLIC BLOOD PRESSURE: 102 MMHG | DIASTOLIC BLOOD PRESSURE: 59 MMHG

## 2019-02-15 PROBLEM — K80.20 CALCULUS OF GALLBLADDER WITHOUT CHOLECYSTITIS WITHOUT OBSTRUCTION: Chronic | Status: ACTIVE | Noted: 2019-02-14

## 2019-02-15 LAB
ANION GAP SERPL CALC-SCNC: 16 MMOL/L — HIGH (ref 7–14)
ANION GAP SERPL CALC-SCNC: 19 MMOL/L — HIGH (ref 7–14)
BASOPHILS # BLD AUTO: 0.01 K/UL — SIGNIFICANT CHANGE UP (ref 0–0.2)
BASOPHILS # BLD AUTO: 0.02 K/UL — SIGNIFICANT CHANGE UP (ref 0–0.2)
BASOPHILS NFR BLD AUTO: 0.1 % — SIGNIFICANT CHANGE UP (ref 0–1)
BASOPHILS NFR BLD AUTO: 0.1 % — SIGNIFICANT CHANGE UP (ref 0–1)
BUN SERPL-MCNC: 7 MG/DL — LOW (ref 10–20)
BUN SERPL-MCNC: 9 MG/DL — LOW (ref 10–20)
CALCIUM SERPL-MCNC: 8.7 MG/DL — SIGNIFICANT CHANGE UP (ref 8.5–10.1)
CALCIUM SERPL-MCNC: 9.1 MG/DL — SIGNIFICANT CHANGE UP (ref 8.5–10.1)
CHLORIDE SERPL-SCNC: 98 MMOL/L — SIGNIFICANT CHANGE UP (ref 98–110)
CHLORIDE SERPL-SCNC: 98 MMOL/L — SIGNIFICANT CHANGE UP (ref 98–110)
CO2 SERPL-SCNC: 19 MMOL/L — SIGNIFICANT CHANGE UP (ref 17–32)
CO2 SERPL-SCNC: 22 MMOL/L — SIGNIFICANT CHANGE UP (ref 17–32)
CREAT SERPL-MCNC: 0.7 MG/DL — SIGNIFICANT CHANGE UP (ref 0.7–1.5)
CREAT SERPL-MCNC: 0.8 MG/DL — SIGNIFICANT CHANGE UP (ref 0.7–1.5)
CULTURE RESULTS: NO GROWTH — SIGNIFICANT CHANGE UP
EOSINOPHIL # BLD AUTO: 0 K/UL — SIGNIFICANT CHANGE UP (ref 0–0.7)
EOSINOPHIL # BLD AUTO: 0 K/UL — SIGNIFICANT CHANGE UP (ref 0–0.7)
EOSINOPHIL NFR BLD AUTO: 0 % — SIGNIFICANT CHANGE UP (ref 0–8)
EOSINOPHIL NFR BLD AUTO: 0 % — SIGNIFICANT CHANGE UP (ref 0–8)
GLUCOSE SERPL-MCNC: 112 MG/DL — HIGH (ref 70–99)
GLUCOSE SERPL-MCNC: 141 MG/DL — HIGH (ref 70–99)
HCT VFR BLD CALC: 34.9 % — LOW (ref 37–47)
HCT VFR BLD CALC: 35.9 % — LOW (ref 37–47)
HGB BLD-MCNC: 11.5 G/DL — LOW (ref 12–16)
HGB BLD-MCNC: 11.6 G/DL — LOW (ref 12–16)
IMM GRANULOCYTES NFR BLD AUTO: 0.5 % — HIGH (ref 0.1–0.3)
IMM GRANULOCYTES NFR BLD AUTO: 0.6 % — HIGH (ref 0.1–0.3)
LYMPHOCYTES # BLD AUTO: 0.72 K/UL — LOW (ref 1.2–3.4)
LYMPHOCYTES # BLD AUTO: 0.86 K/UL — LOW (ref 1.2–3.4)
LYMPHOCYTES # BLD AUTO: 4.5 % — LOW (ref 20.5–51.1)
LYMPHOCYTES # BLD AUTO: 5.7 % — LOW (ref 20.5–51.1)
MAGNESIUM SERPL-MCNC: 1.7 MG/DL — LOW (ref 1.8–2.4)
MCHC RBC-ENTMCNC: 28.6 PG — SIGNIFICANT CHANGE UP (ref 27–31)
MCHC RBC-ENTMCNC: 29.1 PG — SIGNIFICANT CHANGE UP (ref 27–31)
MCHC RBC-ENTMCNC: 32.3 G/DL — SIGNIFICANT CHANGE UP (ref 32–37)
MCHC RBC-ENTMCNC: 33 G/DL — SIGNIFICANT CHANGE UP (ref 32–37)
MCV RBC AUTO: 88.4 FL — SIGNIFICANT CHANGE UP (ref 81–99)
MCV RBC AUTO: 88.4 FL — SIGNIFICANT CHANGE UP (ref 81–99)
MONOCYTES # BLD AUTO: 0.22 K/UL — SIGNIFICANT CHANGE UP (ref 0.1–0.6)
MONOCYTES # BLD AUTO: 0.51 K/UL — SIGNIFICANT CHANGE UP (ref 0.1–0.6)
MONOCYTES NFR BLD AUTO: 1.2 % — LOW (ref 1.7–9.3)
MONOCYTES NFR BLD AUTO: 4.1 % — SIGNIFICANT CHANGE UP (ref 1.7–9.3)
NEUTROPHILS # BLD AUTO: 11.25 K/UL — HIGH (ref 1.4–6.5)
NEUTROPHILS # BLD AUTO: 17.82 K/UL — HIGH (ref 1.4–6.5)
NEUTROPHILS NFR BLD AUTO: 89.5 % — HIGH (ref 42.2–75.2)
NEUTROPHILS NFR BLD AUTO: 93.7 % — HIGH (ref 42.2–75.2)
NRBC # BLD: 0 /100 WBCS — SIGNIFICANT CHANGE UP (ref 0–0)
NRBC # BLD: 0 /100 WBCS — SIGNIFICANT CHANGE UP (ref 0–0)
PHOSPHATE SERPL-MCNC: 3.3 MG/DL — SIGNIFICANT CHANGE UP (ref 2.1–4.9)
PLATELET # BLD AUTO: 200 K/UL — SIGNIFICANT CHANGE UP (ref 130–400)
PLATELET # BLD AUTO: 200 K/UL — SIGNIFICANT CHANGE UP (ref 130–400)
POTASSIUM SERPL-MCNC: 3.9 MMOL/L — SIGNIFICANT CHANGE UP (ref 3.5–5)
POTASSIUM SERPL-MCNC: 4 MMOL/L — SIGNIFICANT CHANGE UP (ref 3.5–5)
POTASSIUM SERPL-SCNC: 3.9 MMOL/L — SIGNIFICANT CHANGE UP (ref 3.5–5)
POTASSIUM SERPL-SCNC: 4 MMOL/L — SIGNIFICANT CHANGE UP (ref 3.5–5)
RBC # BLD: 3.95 M/UL — LOW (ref 4.2–5.4)
RBC # BLD: 4.06 M/UL — LOW (ref 4.2–5.4)
RBC # FLD: 12.5 % — SIGNIFICANT CHANGE UP (ref 11.5–14.5)
RBC # FLD: 12.8 % — SIGNIFICANT CHANGE UP (ref 11.5–14.5)
SODIUM SERPL-SCNC: 136 MMOL/L — SIGNIFICANT CHANGE UP (ref 135–146)
SODIUM SERPL-SCNC: 136 MMOL/L — SIGNIFICANT CHANGE UP (ref 135–146)
SPECIMEN SOURCE: SIGNIFICANT CHANGE UP
WBC # BLD: 12.57 K/UL — HIGH (ref 4.8–10.8)
WBC # BLD: 19.01 K/UL — HIGH (ref 4.8–10.8)
WBC # FLD AUTO: 12.57 K/UL — HIGH (ref 4.8–10.8)
WBC # FLD AUTO: 19.01 K/UL — HIGH (ref 4.8–10.8)

## 2019-02-15 RX ORDER — ACETAMINOPHEN 500 MG
2 TABLET ORAL
Qty: 0 | Refills: 0 | COMMUNITY
Start: 2019-02-15

## 2019-02-15 RX ORDER — OXYCODONE HYDROCHLORIDE 5 MG/1
1 TABLET ORAL
Qty: 5 | Refills: 0 | OUTPATIENT
Start: 2019-02-15

## 2019-02-15 RX ADMIN — Medication 650 MILLIGRAM(S): at 04:30

## 2019-02-15 RX ADMIN — HEPARIN SODIUM 5000 UNIT(S): 5000 INJECTION INTRAVENOUS; SUBCUTANEOUS at 05:33

## 2019-02-15 RX ADMIN — Medication 650 MILLIGRAM(S): at 03:54

## 2019-02-15 RX ADMIN — ONDANSETRON 4 MILLIGRAM(S): 8 TABLET, FILM COATED ORAL at 08:22

## 2019-02-15 NOTE — DISCHARGE NOTE ADULT - PLAN OF CARE
Complete recovery 1. Continue using all home medications. Use Tylenols for mild pain and use oxycodone for moderate/breakthrough pain as needed.  2. Continue regular diet and fluid intake as tolerated.  3. Ambulate and use incentive spirometer (10x/hour) when awake.  4. Dressing can be off 48-72 hours post-op. Leave Steri-strips and let them fall off.  5. Shower is okay to take.  6. Do not lift heavy weight (10 lbs or more) for 4-6 weeks.  7. Follow-up with Dr. Chen in his Clinic in 1-2 weeks and call [phone] to schedule your appointment. Also follow up with your OB/Gyn Physician in coming week.  8. In case of any worsening of symptoms/signs, please go to nearby Emergency Department or call [phone].

## 2019-02-15 NOTE — PROGRESS NOTE ADULT - SUBJECTIVE AND OBJECTIVE BOX
Progress Note: Trauma Surgery  Patient: DANICA ROBERTSON , 29y (1990)Female   MRN: [id number]  Location: [address]  Visit: 19 Inpatient  Date: 02-15-19 @ 09:10  Hospital Day: 3  Post-op Day: 1    Procedure/Injury: s/p lap wily  Events over 24h: No acute event overnight. No new complaint. Pt is vitally stable. On regular diet, tolerating well. Denies nausea, vomiting, and/or abdominal pain. Ambulating adequately. Voiding adequately. -Gas, -BM, +burping.    Vitals: T(F): 97.2 (02-15-19 @ 04:30), Max: 99.1 (19 @ 21:49)  HR: 89 (02-15-19 @ 04:30)  BP: 102/59 (02-15-19 @ 04:30) (102/57 - 119/56)  RR: 18 (02-15-19 @ 04:30)  SpO2: 96% (19 @ 23:00)    Diet: Diet, Regular:   Low Fat (LOWFAT) (02-15-19 @ 09:09)    IV Fluid:   Bowel function: Bowel movement [  ] Flatus [  ]    In:   19 @ 07:01  -  02-15-19 @ 07:00  --------------------------------------------------------  IN: 350 mL      Out:   19 @ 07:01  -  02-15-19 @ 07:00  --------------------------------------------------------  OUT:    Voided: 150 mL  Total OUT: 150 mL        Net:   19 @ 07:01  -  02-15-19 @ 07:00  --------------------------------------------------------  NET: 200 mL      Physical Examination:  General: NAD, AAOx3  HEENT: NCAT, RENETTA, WNL  Heart: S1 S2, No MRG RRR   Lungs: CTABL +BS Equal BL, No WRC  Abdomen:  +BS. SNDNT. Obese. No rigidity, guarding, or rebound tenderness. No masses palpated. No McBurney point tenderness. No Willard's sign. No Rovsing's sign.   MSK/Extremities: ROM intact BL UE/LE. Normal gait. No significant deformity or joint abnormality. Peripheral pulses intact. WNL  Neuro: CN II-XII intact. Strength and sensation symmetric +intact BL  Skin: Warm/dry, Normal color, No jaundice.   Incisions/Wounds: Dressings in place, clean, dry and intact, no signs of infection/active bleeding/drainage    Medications: [Standing]  acetaminophen   Tablet .. 650 milliGRAM(s) Oral every 4 hours  heparin  Injectable 5000 Unit(s) SubCutaneous every 8 hours  prochlorperazine   Injectable 10 milliGRAM(s) IV Push once    Medications:[PRN]  famotidine    Tablet 20 milliGRAM(s) Oral every 12 hours PRN  ibuprofen  Tablet. 400 milliGRAM(s) Oral every 6 hours PRN  ondansetron Injectable 4 milliGRAM(s) IV Push every 6 hours PRN    Labs:                        11.6   19.01 )-----------( 200      ( 15 Feb 2019 00:33 )             35.9   02-15    136  |  98  |  9<L>  ----------------------------<  112<H>  3.9   |  19  |  0.8    Ca    8.7      15 Feb 2019 00:33  Phos  3.3     02-15  Mg     1.7     02-15    TPro  7.2  /  Alb  4.5  /  TBili  0.4  /  DBili  <0.2  /  AST  31  /  ALT  12  /  AlkPhos  53  02-13  LIVER FUNCTIONS - ( 2019 21:50 )  Alb: 4.5 g/dL / Pro: 7.2 g/dL / ALK PHOS: 53 U/L / ALT: 12 U/L / AST: 31 U/L / GGT: x             Micro/Urine:  Urinalysis Basic - ( 2019 21:05 )    Color: Dark Yellow / Appearance: Cloudy / S.025 / pH: x  Gluc: x / Ketone: >=80  / Bili: Small / Urobili: 1.0 mg/dL   Blood: x / Protein: 30 mg/dL / Nitrite: Negative   Leuk Esterase: Negative / RBC: 1-2 /HPF / WBC x   Sq Epi: x / Non Sq Epi: Few /HPF / Bacteria: Moderate /HPF      Imaging:  None/24h

## 2019-02-15 NOTE — DISCHARGE NOTE ADULT - PATIENT PORTAL LINK FT
You can access the [facility] Patient Portal, offered by [facility], by registering with the following website: [email or web address]

## 2019-02-15 NOTE — DISCHARGE NOTE ADULT - MEDICATION SUMMARY - MEDICATIONS TO TAKE
I will START or STAY ON the medications listed below when I get home from the hospital:    acetaminophen 325 mg oral tablet  -- 2 tab(s) by mouth every 4 hours  -- Indication: For Mild pain    oxyCODONE 5 mg oral tablet  -- 1 tab(s) by mouth every 8 hours, As Needed -for severe pain MDD:5   -- Caution federal law prohibits the transfer of this drug to any person other  than the person for whom it was prescribed.  It is very important that you take or use this exactly as directed.  Do not skip doses or discontinue unless directed by your doctor.  May cause drowsiness.  Alcohol may intensify this effect.  Use care when operating dangerous machinery.  This prescription cannot be refilled.  Using more of this medication than prescribed may cause serious breathing problems.    -- Indication: For Moderate/breakthrough pain    Robaxin-750 oral tablet  -- 2 tab(s) by mouth 3 times a day   -- May cause drowsiness.  Alcohol may intensify this effect.  Use care when operating dangerous machinery.    -- Indication: For Home meds

## 2019-02-15 NOTE — DISCHARGE NOTE ADULT - HOSPITAL COURSE
On 2/14, 28 yo Female presented after recent ED on 2/10 where she was diagnosed with biliary colic and pregnancy. No other significant PMH. Today again c/o continued RUQ abdominal pain, n/v since 2/10. Pt stateed that the pain radiates from her RUQ through her ribs to her right back and right shoulder. No aggregating or relieving factors. Not associated with food. No diarrhea. US showed Cholelithiasis without sonographic evidence of acute cholecystitis. Pt was taken to OR and now s/p lap wily. Pt tolerated procedure well with no complications. On 2/15, oo acute event overnight. No new complaint. Pt is vitally stable. On regular diet, tolerating well. Denies nausea, vomiting, and/or abdominal pain. Ambulating adequately. Voiding adequately. -Gas, -BM, +burping. Repeat US was performed for pregnancy. Pt is ready to be discharged home with above mentioned instructions. On 2/14, 30 yo Female presented after recent ED on 2/10 where she was diagnosed with biliary colic and pregnancy. No other significant PMH. Today again c/o continued RUQ abdominal pain, n/v since 2/10. Pt stateed that the pain radiates from her RUQ through her ribs to her right back and right shoulder. No aggregating or relieving factors. Not associated with food. No diarrhea. US showed Cholelithiasis without sonographic evidence of acute cholecystitis. Pt was taken to OR and now s/p lap wily. Pt tolerated procedure well with no complications. On 2/15, oo acute event overnight. No new complaint. Pt is vitally stable. On regular diet, tolerating well. Denies nausea, vomiting, and/or abdominal pain. Ambulating adequately. Voiding adequately. -Gas, -BM, +burping. Pt is ready to be discharged home with above mentioned instructions.

## 2019-02-15 NOTE — DISCHARGE NOTE ADULT - CARE PLAN
Principal Discharge DX:	S/P laparoscopic cholecystectomy  Goal:	Complete recovery  Assessment and plan of treatment:	1. Continue using all home medications. Use Tylenols for mild pain and use oxycodone for moderate/breakthrough pain as needed.  2. Continue regular diet and fluid intake as tolerated.  3. Ambulate and use incentive spirometer (10x/hour) when awake.  4. Dressing can be off 48-72 hours post-op. Leave Steri-strips and let them fall off.  5. Shower is okay to take.  6. Do not lift heavy weight (10 lbs or more) for 4-6 weeks.  7. Follow-up with Dr. Chen in his Clinic in 1-2 weeks and call [phone] to schedule your appointment. Also follow up with your OB/Gyn Physician in coming week.  8. In case of any worsening of symptoms/signs, please go to nearby Emergency Department or call [phone].

## 2019-02-15 NOTE — PROGRESS NOTE ADULT - ASSESSMENT
Assessment:  29y Female patient admitted S/P lap appy, with the above physical exam, labs, and imaging findings.    Plan:  -Pain control as needed  -Hemodynamic monitoring as per routine  -Encourage ambulation and incentive spirometer use (10x/hr when awake)  -GI and DVT prophylaxis  -Check and replete CBC and BMP q daily  -Strict input and output monitoring  -Continue current management  -D/C today  -Follow up with OB/Gyn    Date/Time: 02-15-19 @ 09:10

## 2019-02-15 NOTE — DISCHARGE NOTE ADULT - CARE PROVIDER_API CALL
Gregg Chen)  Surgery  [address] [place] [address], [address] [place] [address]  Phone: [phone]  Fax: [phone]  Follow Up Time:

## 2019-02-15 NOTE — DISCHARGE NOTE ADULT - ADDITIONAL INSTRUCTIONS
1. Continue using all home medications. Use Tylenols for mild pain and use oxycodone for moderate/breakthrough pain as needed.  2. Continue regular diet and fluid intake as tolerated.  3. Ambulate and use incentive spirometer (10x/hour) when awake.  4. Dressing can be off 48-72 hours post-op. Leave Steri-strips and let them fall off.  5. Shower is okay to take.  6. Do not lift heavy weight (10 lbs or more) for 4-6 weeks.  7. Follow-up with Dr. Chen in his Clinic in 1-2 weeks and call [phone] to schedule your appointment. Also follow up with your OB/Gyn Physician in coming week.  8. In case of any worsening of symptoms/signs, please go to nearby Emergency Department or call [phone].

## 2019-02-15 NOTE — CHART NOTE - NSCHARTNOTEFT_GEN_A_CORE
SURGERY POST-OP NOTE:    S: Patient underwent laparoscopic cholecystectomy. She tolerated procedure without issue and sent to PACU. Patient denies chest pain, shortness of breath, nausea, vomiting, lightheadedness, or dizziness. Pain was well controlled.          Gen: NAD  Resp: Non-labored respirations  CV: regular rate, rhythm  Abd: Soft, mild walt-incisional tenderness, port sites w/ dermabond in place, remnant EKG lead on incision w/ dermabond, patient hesitant to permit removal, nondistended.  No palpable masses.     Assessment/Plan:  29y Female now POD#0 s/p     - Pain control  - Reg Diet  - DVT PPX  - Early ambulation  - Incentive spirometry    Dispo: floor

## 2019-02-18 LAB — SURGICAL PATHOLOGY STUDY: SIGNIFICANT CHANGE UP

## 2019-02-19 ENCOUNTER — EMERGENCY (EMERGENCY)
Facility: HOSPITAL | Age: 29
LOS: 0 days | Discharge: HOME | End: 2019-02-20
Attending: EMERGENCY MEDICINE | Admitting: EMERGENCY MEDICINE

## 2019-02-19 VITALS
HEART RATE: 81 BPM | DIASTOLIC BLOOD PRESSURE: 82 MMHG | RESPIRATION RATE: 18 BRPM | OXYGEN SATURATION: 97 % | TEMPERATURE: 99 F | SYSTOLIC BLOOD PRESSURE: 115 MMHG

## 2019-02-19 DIAGNOSIS — K80.20 CALCULUS OF GALLBLADDER WITHOUT CHOLECYSTITIS WITHOUT OBSTRUCTION: Chronic | ICD-10-CM

## 2019-02-19 DIAGNOSIS — R10.30 LOWER ABDOMINAL PAIN, UNSPECIFIED: ICD-10-CM

## 2019-02-19 DIAGNOSIS — Z3A.08 8 WEEKS GESTATION OF PREGNANCY: ICD-10-CM

## 2019-02-19 DIAGNOSIS — Z79.891 LONG TERM (CURRENT) USE OF OPIATE ANALGESIC: ICD-10-CM

## 2019-02-19 DIAGNOSIS — O21.9 VOMITING OF PREGNANCY, UNSPECIFIED: ICD-10-CM

## 2019-02-19 DIAGNOSIS — Z79.899 OTHER LONG TERM (CURRENT) DRUG THERAPY: ICD-10-CM

## 2019-02-19 DIAGNOSIS — G89.18 OTHER ACUTE POSTPROCEDURAL PAIN: ICD-10-CM

## 2019-02-19 DIAGNOSIS — Z90.49 ACQUIRED ABSENCE OF OTHER SPECIFIED PARTS OF DIGESTIVE TRACT: ICD-10-CM

## 2019-02-19 LAB
ALBUMIN SERPL ELPH-MCNC: 4.7 G/DL — SIGNIFICANT CHANGE UP (ref 3.5–5.2)
ALP SERPL-CCNC: 80 U/L — SIGNIFICANT CHANGE UP (ref 30–115)
ALT FLD-CCNC: 163 U/L — HIGH (ref 0–41)
ANION GAP SERPL CALC-SCNC: 15 MMOL/L — HIGH (ref 7–14)
AST SERPL-CCNC: 76 U/L — HIGH (ref 0–41)
BASOPHILS # BLD AUTO: 0.03 K/UL — SIGNIFICANT CHANGE UP (ref 0–0.2)
BASOPHILS NFR BLD AUTO: 0.2 % — SIGNIFICANT CHANGE UP (ref 0–1)
BILIRUB DIRECT SERPL-MCNC: 0.2 MG/DL — SIGNIFICANT CHANGE UP (ref 0–0.2)
BILIRUB INDIRECT FLD-MCNC: 0.4 MG/DL — SIGNIFICANT CHANGE UP (ref 0.2–1.2)
BILIRUB SERPL-MCNC: 0.6 MG/DL — SIGNIFICANT CHANGE UP (ref 0.2–1.2)
BUN SERPL-MCNC: 14 MG/DL — SIGNIFICANT CHANGE UP (ref 10–20)
CALCIUM SERPL-MCNC: 10.1 MG/DL — SIGNIFICANT CHANGE UP (ref 8.5–10.1)
CHLORIDE SERPL-SCNC: 99 MMOL/L — SIGNIFICANT CHANGE UP (ref 98–110)
CO2 SERPL-SCNC: 23 MMOL/L — SIGNIFICANT CHANGE UP (ref 17–32)
CREAT SERPL-MCNC: 0.7 MG/DL — SIGNIFICANT CHANGE UP (ref 0.7–1.5)
EOSINOPHIL # BLD AUTO: 0.03 K/UL — SIGNIFICANT CHANGE UP (ref 0–0.7)
EOSINOPHIL NFR BLD AUTO: 0.2 % — SIGNIFICANT CHANGE UP (ref 0–8)
GLUCOSE SERPL-MCNC: 95 MG/DL — SIGNIFICANT CHANGE UP (ref 70–99)
HCG SERPL-ACNC: HIGH MIU/ML
HCT VFR BLD CALC: 38.9 % — SIGNIFICANT CHANGE UP (ref 37–47)
HGB BLD-MCNC: 13 G/DL — SIGNIFICANT CHANGE UP (ref 12–16)
IMM GRANULOCYTES NFR BLD AUTO: 0.5 % — HIGH (ref 0.1–0.3)
LACTATE SERPL-SCNC: 1 MMOL/L — SIGNIFICANT CHANGE UP (ref 0.5–2.2)
LIDOCAIN IGE QN: 21 U/L — SIGNIFICANT CHANGE UP (ref 7–60)
LYMPHOCYTES # BLD AUTO: 1.39 K/UL — SIGNIFICANT CHANGE UP (ref 1.2–3.4)
LYMPHOCYTES # BLD AUTO: 10.1 % — LOW (ref 20.5–51.1)
MCHC RBC-ENTMCNC: 29.4 PG — SIGNIFICANT CHANGE UP (ref 27–31)
MCHC RBC-ENTMCNC: 33.4 G/DL — SIGNIFICANT CHANGE UP (ref 32–37)
MCV RBC AUTO: 88 FL — SIGNIFICANT CHANGE UP (ref 81–99)
MONOCYTES # BLD AUTO: 0.84 K/UL — HIGH (ref 0.1–0.6)
MONOCYTES NFR BLD AUTO: 6.1 % — SIGNIFICANT CHANGE UP (ref 1.7–9.3)
NEUTROPHILS # BLD AUTO: 11.42 K/UL — HIGH (ref 1.4–6.5)
NEUTROPHILS NFR BLD AUTO: 82.9 % — HIGH (ref 42.2–75.2)
NRBC # BLD: 0 /100 WBCS — SIGNIFICANT CHANGE UP (ref 0–0)
PLATELET # BLD AUTO: 228 K/UL — SIGNIFICANT CHANGE UP (ref 130–400)
POTASSIUM SERPL-MCNC: 4.2 MMOL/L — SIGNIFICANT CHANGE UP (ref 3.5–5)
POTASSIUM SERPL-SCNC: 4.2 MMOL/L — SIGNIFICANT CHANGE UP (ref 3.5–5)
PROT SERPL-MCNC: 7.2 G/DL — SIGNIFICANT CHANGE UP (ref 6–8)
RBC # BLD: 4.42 M/UL — SIGNIFICANT CHANGE UP (ref 4.2–5.4)
RBC # FLD: 12.7 % — SIGNIFICANT CHANGE UP (ref 11.5–14.5)
SODIUM SERPL-SCNC: 137 MMOL/L — SIGNIFICANT CHANGE UP (ref 135–146)
WBC # BLD: 13.78 K/UL — HIGH (ref 4.8–10.8)
WBC # FLD AUTO: 13.78 K/UL — HIGH (ref 4.8–10.8)

## 2019-02-19 RX ORDER — ONDANSETRON 8 MG/1
4 TABLET, FILM COATED ORAL ONCE
Qty: 0 | Refills: 0 | Status: COMPLETED | OUTPATIENT
Start: 2019-02-19 | End: 2019-02-19

## 2019-02-19 RX ORDER — SODIUM CHLORIDE 9 MG/ML
1000 INJECTION INTRAMUSCULAR; INTRAVENOUS; SUBCUTANEOUS ONCE
Qty: 0 | Refills: 0 | Status: COMPLETED | OUTPATIENT
Start: 2019-02-19 | End: 2019-02-19

## 2019-02-19 RX ADMIN — ONDANSETRON 4 MILLIGRAM(S): 8 TABLET, FILM COATED ORAL at 22:16

## 2019-02-19 RX ADMIN — SODIUM CHLORIDE 1000 MILLILITER(S): 9 INJECTION INTRAMUSCULAR; INTRAVENOUS; SUBCUTANEOUS at 22:15

## 2019-02-19 NOTE — ED ADULT NURSE NOTE - OBJECTIVE STATEMENT
patient s/p gallbladder surgery on wednesday night d/cd from hospital on friday. patient unable to tolerate PO since being d/c'd continuous vomiting.

## 2019-02-20 VITALS
SYSTOLIC BLOOD PRESSURE: 106 MMHG | DIASTOLIC BLOOD PRESSURE: 58 MMHG | TEMPERATURE: 98 F | RESPIRATION RATE: 17 BRPM | OXYGEN SATURATION: 97 % | HEART RATE: 71 BPM

## 2019-02-20 LAB
APPEARANCE UR: ABNORMAL
BACTERIA # UR AUTO: ABNORMAL /HPF
BILIRUB UR-MCNC: ABNORMAL
COLOR SPEC: SIGNIFICANT CHANGE UP
DIFF PNL FLD: NEGATIVE — SIGNIFICANT CHANGE UP
EPI CELLS # UR: ABNORMAL /HPF
GLUCOSE UR QL: NEGATIVE MG/DL — SIGNIFICANT CHANGE UP
KETONES UR-MCNC: >=80
LEUKOCYTE ESTERASE UR-ACNC: NEGATIVE — SIGNIFICANT CHANGE UP
NITRITE UR-MCNC: NEGATIVE — SIGNIFICANT CHANGE UP
PH UR: 6.5 — SIGNIFICANT CHANGE UP (ref 5–8)
PROT UR-MCNC: ABNORMAL MG/DL
SP GR SPEC: 1.02 — SIGNIFICANT CHANGE UP (ref 1.01–1.03)
UROBILINOGEN FLD QL: 1 MG/DL (ref 0.2–0.2)

## 2019-02-20 RX ORDER — DOXYLAMINE SUCCINATE AND PYRIDOXINE HYDROCHLORIDE, DELAYED RELEASE TABLETS 10 MG/10 MG 10; 10 MG/1; MG/1
2 TABLET, DELAYED RELEASE ORAL
Qty: 20 | Refills: 0 | OUTPATIENT
Start: 2019-02-20 | End: 2019-03-01

## 2019-02-20 RX ORDER — ONDANSETRON 8 MG/1
4 TABLET, FILM COATED ORAL ONCE
Qty: 0 | Refills: 0 | Status: DISCONTINUED | OUTPATIENT
Start: 2019-02-20 | End: 2019-02-20

## 2019-02-20 NOTE — ED PROVIDER NOTE - CARE PROVIDER_API CALL
Gregg Chen)  Surgery  [address] [place] [address], [address] [place] [address]  Phone: [phone]  Fax: [phone]  Follow Up Time: Routine

## 2019-02-20 NOTE — ED PROVIDER NOTE - PHYSICAL EXAMINATION
VITAL SIGNS: I have reviewed nursing notes and confirm.  CONSTITUTIONAL: Well-developed; well-nourished; in no acute distress.  SKIN: Skin exam is warm and dry, no acute rash.  HEAD: Normocephalic; atraumatic.  EYES: Conjunctiva and sclera clear.  ENT: No nasal discharge; airway clear.   NECK: Supple; non tender.  CARD: S1, S2 normal; no murmurs, gallops, or rubs. Regular rate and rhythm.  RESP: No wheezes, rales or rhonchi. Speaking in full sentences.   ABD: Normal bowel sounds; soft; non-distended; (+) mild TTP around surgical incisions. Incisions clean/dry/intact/well healing, no erythema/warmth; No rebound or guarding. No CVA tenderness.  EXT: Normal ROM. No clubbing, cyanosis or edema.  NEURO: Alert, oriented. Grossly unremarkable. No focal deficits.

## 2019-02-20 NOTE — ED PROVIDER NOTE - PROGRESS NOTE DETAILS
Pt reports improvement in symptoms, currently asymptomatic. Tolerating PO intake, abdomen soft, NT/ND. Cleared by surgery. Discussed results and provided copy to pt. Pt understands to follow-up with her GYN/Surgeon. Pt understands to return to ED if symptoms return/worsen. Agreeable to discharge.

## 2019-02-20 NOTE — ED PROVIDER NOTE - OBJECTIVE STATEMENT
30 yo F with PMHx of recent lap wily (Dr. Chen) and recent pregnancy (currently around 8 weeks) presents to the ED c/o nausea, non-bilious/non-bloody vomiting and abdominal pain around incision sites. Pt has been unable to tolerate PO intake since this morning. Denies other complaints. Pt denies fever, chills, diarrhea, headache, dizziness, weakness, chest pain, SOB, back pain, LOC, trauma, urinary symptoms, cough, calf pain/swelling, recent travel, vaginal bleeding/discharge.

## 2019-02-20 NOTE — PROGRESS NOTE ADULT - ASSESSMENT
28 yo F with global abd pain, greatest at subumbilical site    PLAN:    - RUQ US and subumbilical US to r/o fluid collection 28 yo F with global abd pain, greatest at subumbilical site    PLAN:    - RUQ US and subumbilical US to r/o fluid collection      Tender around umbilicus, no fluctance or erythema palpated  Ultrasound abdominal wall and RUQ  will follow

## 2019-02-20 NOTE — ED PROVIDER NOTE - CLINICAL SUMMARY MEDICAL DECISION MAKING FREE TEXT BOX
The patient was found to have no collection, normal labs, pain improved and is PO tolerant. She was assessed by the surgical team, no evidence of wound infection, gallbladder fossa collection, retained stone. Sx likely typical post operative pain coupled with pregnancy related nausea.     The patient was counseled on return precautions, need for both surgical and OB follow up, bland diet, hydration.

## 2019-02-20 NOTE — ED PROVIDER NOTE - NSFOLLOWUPINSTRUCTIONS_ED_ALL_ED_FT
Nausea / Vomiting    Nausea is the feeling that you have to vomit. As nausea gets worse, it can lead to vomiting. Vomiting puts you at an increased risk for dehydration. Older adults and people with other diseases or a weak immune system are at higher risk for dehydration. Drink clear fluids in small but frequent amounts as tolerated. Eat bland, easy-to-digest foods in small amounts as tolerated.    SEEK IMMEDIATE MEDICAL CARE IF YOU HAVE ANY OF THE FOLLOWING SYMPTOMS: fever, inability to keep sufficient fluids down, black or bloody vomitus, black or bloody stools, lightheadedness/dizziness, chest pain, severe headache, rash, shortness of breath, cold or clammy skin, confusion, pain with urination, or any signs of dehydration.     Abdominal Pain    Many things can cause abdominal pain. Many times, abdominal pain is not caused by a disease and will improve without treatment. Your health care provider will do a physical exam to determine if there is a dangerous cause of your pain; blood tests and imaging may help determine the cause of your pain. However, in many cases, no cause may be found and you may need further testing as an outpatient. Monitor your abdominal pain for any changes.     SEEK IMMEDIATE MEDICAL CARE IF YOU HAVE ANY OF THE FOLLOWING SYMPTOMS: worsening abdominal pain, uncontrollable vomiting, profuse diarrhea, inability to have bowel movements or pass gas, black or bloody stools, fever accompanying chest pain or back pain, or fainting. These symptoms may represent a serious problem that is an emergency. Do not wait to see if the symptoms will go away. Get medical help right away. Call 911 and do not drive yourself to the hospital.

## 2019-02-20 NOTE — ED PROVIDER NOTE - ATTENDING CONTRIBUTION TO CARE
28 yo F, currently pregnant and 4 days sp lap choly for recurrent biliary colic, here for assessment of nasuea, pain to lower surgical site. No fever, chills, discharge, vomiting or diarrhea.    VS normal, patient looks well has no overlying cellulitis or fluctuance, has well healing incision sites.    Will check labs, get US, consult surgical team for assessment.     DDx include post op infection, typical post op pain, retained stone, pregnancy related nausea.

## 2019-02-20 NOTE — ED PROVIDER NOTE - NS ED ROS FT
Review of Systems  Constitutional:  No fever, chills, malaise, generalized weakness.  Eyes:  No visual changes, eye pain, or discharge.  ENMT:  No hearing changes, pain, or discharge. No nasal congestion, discharge, or bleeding. No throat pain, swelling, or difficulty swallowing.  Cardiac:  No chest pain, palpitations, syncope, or edema.  Respiratory:  No dyspnea, orthopnea, stridor, wheezing, or cough. No hemoptysis.  GI:  No diarrhea. No melena or hematochezia. (+) abd pain, nausea, vomiting  :  No dysuria, hematuria, frequency, or burning. Normal urine output.   MS:  No myalgia, muscle weakness, gait abnormality, joint swelling, joint pain, or back pain.  Skin:  No skin rash, pruritis, jaundice, or lesions.  Neuro:  No headache, dizziness, loss of sensation, or focal weakness.  No change in mental status.   Endocrine: No history of thyroid disease or diabetes.  Heme/Lymph: No jaundice, easy bruising, or bleeding tendency. No history of anemia

## 2019-02-20 NOTE — PROGRESS NOTE ADULT - SUBJECTIVE AND OBJECTIVE BOX
DANICA ROBERTSON [id number]  29y Female    HPI:  Pt presents to ER after being d/c on 2/15 s/p lap wily for biliary cholic. Pt ~7 weeks pregnant. States she has had diffuse abd pain with n/v today. Increased tenderness distal to her umbilicus. WBC 13.     PAST MEDICAL & SURGICAL HISTORY:  Gallstone  Gallstones        MEDICATIONS  (STANDING):    MEDICATIONS  (PRN):      Allergies    No Known Allergies    Intolerances      Vital Signs Last 24 Hrs  T(C): 37.1 (19 Feb 2019 20:34), Max: 37.1 (19 Feb 2019 20:34)  T(F): 98.7 (19 Feb 2019 20:34), Max: 98.7 (19 Feb 2019 20:34)  HR: 81 (19 Feb 2019 20:34) (81 - 81)  BP: 115/82 (19 Feb 2019 20:34) (115/82 - 115/82)  RR: 18 (19 Feb 2019 20:34) (18 - 18)  SpO2: 97% (19 Feb 2019 20:34) (97% - 97%)    PHYSICAL EXAM:  GENERAL: mildly distressed  CHEST/LUNG: no obvious increased wob  ABDOMEN: Soft, ecchymosis distal to umbilicus. Globally tender, but greatest subumbilical. laparoscopic portal sites with violaceous color. umbilical port stapled.         LABS:                        13.0   13.78 )-----------( 228      ( 19 Feb 2019 22:00 )             38.9       Auto Immature Granulocyte %: 0.5 % (02-19-19 @ 22:00)  Auto Neutrophil %: 82.9 % (02-19-19 @ 22:00)    02-19    137  |  99  |  14  ----------------------------<  95  4.2   |  23  |  0.7      Calcium, Total Serum: 10.1 mg/dL (02-19-19 @ 22:00)      LFTs:             7.2  | 0.6  | 76       ------------------[80      ( 19 Feb 2019 22:00 )  4.7  | 0.2  | 163         Lipase:21     Amylase:x         Lactate, Blood: 1.0 mmol/L (02-19-19 @ 22:00)          RADIOLOGY & ADDITIONAL STUDIES: DANICA ROBERTSON [id number]  29y Female    HPI:  Pt presents to ER after being d/c on 2/15 s/p lap wily for biliary cholic. Pt ~7 weeks pregnant. States she has had diffuse abd pain with n/v today. Increased tenderness distal to her umbilicus. WBC 13.     PAST MEDICAL & SURGICAL HISTORY:  Gallstone  Gallstones        MEDICATIONS  (STANDING):    MEDICATIONS  (PRN):      Allergies    No Known Allergies    Intolerances      Vital Signs Last 24 Hrs  T(C): 37.1 (19 Feb 2019 20:34), Max: 37.1 (19 Feb 2019 20:34)  T(F): 98.7 (19 Feb 2019 20:34), Max: 98.7 (19 Feb 2019 20:34)  HR: 81 (19 Feb 2019 20:34) (81 - 81)  BP: 115/82 (19 Feb 2019 20:34) (115/82 - 115/82)  RR: 18 (19 Feb 2019 20:34) (18 - 18)  SpO2: 97% (19 Feb 2019 20:34) (97% - 97%)    PHYSICAL EXAM:  GENERAL: mildly distressed  CHEST/LUNG: no obvious increased wob  ABDOMEN: Soft, ecchymosis distal to umbilicus. Globally tender, but greatest subumbilical. laparoscopic portal sites with violaceous color. umbilical port stapled.         LABS:                        13.0   13.78 )-----------( 228      ( 19 Feb 2019 22:00 )             38.9       Auto Immature Granulocyte %: 0.5 % (02-19-19 @ 22:00)  Auto Neutrophil %: 82.9 % (02-19-19 @ 22:00)    02-19    137  |  99  |  14  ----------------------------<  95  4.2   |  23  |  0.7      Calcium, Total Serum: 10.1 mg/dL (02-19-19 @ 22:00)      LFTs:             7.2  | 0.6  | 76       ------------------[80      ( 19 Feb 2019 22:00 )  4.7  | 0.2  | 163         Lipase:21     Amylase:x         Lactate, Blood: 1.0 mmol/L (02-19-19 @ 22:00)          RADIOLOGY & ADDITIONAL STUDIES:  < from: US Abdomen Limited (02.20.19 @ 02:14) >  IMPRESSION:    Post cholecystectomy.     Normal caliber common bile duct.    < end of copied text >

## 2019-02-21 LAB
CULTURE RESULTS: SIGNIFICANT CHANGE UP
SPECIMEN SOURCE: SIGNIFICANT CHANGE UP

## 2019-02-25 ENCOUNTER — APPOINTMENT (OUTPATIENT)
Dept: OBGYN | Facility: CLINIC | Age: 29
End: 2019-02-25

## 2019-02-25 ENCOUNTER — APPOINTMENT (OUTPATIENT)
Dept: SURGERY | Facility: CLINIC | Age: 29
End: 2019-02-25

## 2019-03-19 ENCOUNTER — OUTPATIENT (OUTPATIENT)
Dept: OUTPATIENT SERVICES | Facility: HOSPITAL | Age: 29
LOS: 1 days | Discharge: HOME | End: 2019-03-19

## 2019-03-19 ENCOUNTER — APPOINTMENT (OUTPATIENT)
Dept: OBGYN | Facility: CLINIC | Age: 29
End: 2019-03-19

## 2019-03-19 ENCOUNTER — NON-APPOINTMENT (OUTPATIENT)
Age: 29
End: 2019-03-19

## 2019-03-19 ENCOUNTER — RESULT CHARGE (OUTPATIENT)
Age: 29
End: 2019-03-19

## 2019-03-19 VITALS
BODY MASS INDEX: 27.99 KG/M2 | WEIGHT: 189 LBS | HEIGHT: 69 IN | DIASTOLIC BLOOD PRESSURE: 64 MMHG | SYSTOLIC BLOOD PRESSURE: 100 MMHG

## 2019-03-19 DIAGNOSIS — K80.20 CALCULUS OF GALLBLADDER WITHOUT CHOLECYSTITIS WITHOUT OBSTRUCTION: Chronic | ICD-10-CM

## 2019-03-19 DIAGNOSIS — Z87.891 PERSONAL HISTORY OF NICOTINE DEPENDENCE: ICD-10-CM

## 2019-03-19 DIAGNOSIS — Z00.00 ENCOUNTER FOR GENERAL ADULT MEDICAL EXAMINATION WITHOUT ABNORMAL FINDINGS: ICD-10-CM

## 2019-03-19 DIAGNOSIS — O36.80X1 PREGNANCY WITH INCONCLUSIVE FETAL VIABILITY, FETUS 1: ICD-10-CM

## 2019-03-19 DIAGNOSIS — Z3A.15 15 WEEKS GESTATION OF PREGNANCY: ICD-10-CM

## 2019-03-21 DIAGNOSIS — Z34.81 ENCOUNTER FOR SUPERVISION OF OTHER NORMAL PREGNANCY, FIRST TRIMESTER: ICD-10-CM

## 2019-03-21 LAB
BILIRUB UR QL STRIP: NORMAL
C TRACH RRNA SPEC QL NAA+PROBE: NOT DETECTED
CLARITY UR: CLEAR
COLLECTION METHOD: NORMAL
GLUCOSE UR-MCNC: NORMAL
HCG UR QL: 0.2 EU/DL
HGB UR QL STRIP.AUTO: NORMAL
KETONES UR-MCNC: NORMAL
LEUKOCYTE ESTERASE UR QL STRIP: NORMAL
N GONORRHOEA RRNA SPEC QL NAA+PROBE: NOT DETECTED
NITRITE UR QL STRIP: NORMAL
PH UR STRIP: 6
PROT UR STRIP-MCNC: NORMAL
SOURCE AMPLIFICATION: NORMAL
SP GR UR STRIP: 1.02

## 2019-03-25 ENCOUNTER — APPOINTMENT (OUTPATIENT)
Dept: ANTEPARTUM | Facility: CLINIC | Age: 29
End: 2019-03-25

## 2019-04-01 ENCOUNTER — APPOINTMENT (OUTPATIENT)
Dept: INTERNAL MEDICINE | Facility: CLINIC | Age: 29
End: 2019-04-01

## 2019-04-16 ENCOUNTER — RESULT CHARGE (OUTPATIENT)
Age: 29
End: 2019-04-16

## 2019-04-16 ENCOUNTER — OUTPATIENT (OUTPATIENT)
Dept: OUTPATIENT SERVICES | Facility: HOSPITAL | Age: 29
LOS: 1 days | Discharge: HOME | End: 2019-04-16

## 2019-04-16 ENCOUNTER — APPOINTMENT (OUTPATIENT)
Dept: OBGYN | Facility: CLINIC | Age: 29
End: 2019-04-16

## 2019-04-16 ENCOUNTER — APPOINTMENT (OUTPATIENT)
Dept: ANTEPARTUM | Facility: CLINIC | Age: 29
End: 2019-04-16

## 2019-04-16 ENCOUNTER — NON-APPOINTMENT (OUTPATIENT)
Age: 29
End: 2019-04-16

## 2019-04-16 VITALS — DIASTOLIC BLOOD PRESSURE: 70 MMHG | WEIGHT: 196 LBS | SYSTOLIC BLOOD PRESSURE: 114 MMHG | BODY MASS INDEX: 28.94 KG/M2

## 2019-04-16 DIAGNOSIS — K80.20 CALCULUS OF GALLBLADDER WITHOUT CHOLECYSTITIS WITHOUT OBSTRUCTION: Chronic | ICD-10-CM

## 2019-04-16 DIAGNOSIS — Z00.00 ENCOUNTER FOR GENERAL ADULT MEDICAL EXAMINATION W/OUT ABNORMAL FINDINGS: ICD-10-CM

## 2019-04-17 LAB
ABO + RH PNL BLD: NORMAL
BACTERIA UR CULT: NORMAL
BASOPHILS # BLD AUTO: 0.03 K/UL
BASOPHILS NFR BLD AUTO: 0.2 %
BILIRUB UR QL STRIP: NORMAL
BLD GP AB SCN SERPL QL: NORMAL
CLARITY UR: CLEAR
COLLECTION METHOD: NORMAL
EOSINOPHIL # BLD AUTO: 0.09 K/UL
EOSINOPHIL NFR BLD AUTO: 0.7 %
GLUCOSE UR-MCNC: NORMAL
HBV SURFACE AG SERPL QL IA: NONREACTIVE
HCG UR QL: 0.2 EU/DL
HCT VFR BLD CALC: 37.5 %
HGB A MFR BLD: 97.3 %
HGB A2 MFR BLD: 2.7 %
HGB BLD-MCNC: 12.3 G/DL
HGB FRACT BLD-IMP: NORMAL
HGB UR QL STRIP.AUTO: NORMAL
HIV1+2 AB SPEC QL IA.RAPID: NONREACTIVE
IMM GRANULOCYTES NFR BLD AUTO: 0.5 %
KETONES UR-MCNC: NORMAL
LEAD BLD-MCNC: <1 UG/DL
LEUKOCYTE ESTERASE UR QL STRIP: NORMAL
LYMPHOCYTES # BLD AUTO: 2.42 K/UL
LYMPHOCYTES NFR BLD AUTO: 19.6 %
MAN DIFF?: NORMAL
MCHC RBC-ENTMCNC: 29.6 PG
MCHC RBC-ENTMCNC: 32.8 G/DL
MCV RBC AUTO: 90.1 FL
MONOCYTES # BLD AUTO: 0.77 K/UL
MONOCYTES NFR BLD AUTO: 6.2 %
NEUTROPHILS # BLD AUTO: 9 K/UL
NEUTROPHILS NFR BLD AUTO: 72.8 %
NITRITE UR QL STRIP: NORMAL
PH UR STRIP: 6
PLATELET # BLD AUTO: 233 K/UL
PROT UR STRIP-MCNC: NORMAL
RBC # BLD: 4.16 M/UL
RBC # FLD: 13.2 %
RUBV IGG FLD-ACNC: 0.9 INDEX
RUBV IGG SER-IMP: NORMAL
SP GR UR STRIP: 1.02
VZV AB TITR SER: POSITIVE
VZV IGG SER IF-ACNC: 458 INDEX
WBC # FLD AUTO: 12.37 K/UL

## 2019-04-19 DIAGNOSIS — Z34.82 ENCOUNTER FOR SUPERVISION OF OTHER NORMAL PREGNANCY, SECOND TRIMESTER: ICD-10-CM

## 2019-04-22 LAB
AR GENE MUT ANL BLD/T: NEGATIVE
CFTR MUT TESTED BLD/T: NEGATIVE
T PALLIDUM AB SER QL IA: NEGATIVE

## 2019-04-29 ENCOUNTER — MESSAGE (OUTPATIENT)
Age: 29
End: 2019-04-29

## 2019-05-14 ENCOUNTER — APPOINTMENT (OUTPATIENT)
Dept: OBGYN | Facility: CLINIC | Age: 29
End: 2019-05-14

## 2019-05-14 ENCOUNTER — APPOINTMENT (OUTPATIENT)
Dept: OBGYN | Facility: CLINIC | Age: 29
End: 2019-05-14
Payer: MEDICAID

## 2019-05-14 ENCOUNTER — NON-APPOINTMENT (OUTPATIENT)
Age: 29
End: 2019-05-14

## 2019-05-21 ENCOUNTER — APPOINTMENT (OUTPATIENT)
Dept: ANTEPARTUM | Facility: CLINIC | Age: 29
End: 2019-05-21
Payer: MEDICAID

## 2019-05-21 ENCOUNTER — OUTPATIENT (OUTPATIENT)
Dept: OUTPATIENT SERVICES | Facility: HOSPITAL | Age: 29
LOS: 1 days | Discharge: HOME | End: 2019-05-21

## 2019-05-21 DIAGNOSIS — K80.20 CALCULUS OF GALLBLADDER WITHOUT CHOLECYSTITIS WITHOUT OBSTRUCTION: Chronic | ICD-10-CM

## 2019-05-21 PROCEDURE — 76805 OB US >/= 14 WKS SNGL FETUS: CPT | Mod: 26

## 2019-06-10 ENCOUNTER — APPOINTMENT (OUTPATIENT)
Dept: OBGYN | Facility: CLINIC | Age: 29
End: 2019-06-10
Payer: MEDICAID

## 2019-06-10 ENCOUNTER — OUTPATIENT (OUTPATIENT)
Dept: OUTPATIENT SERVICES | Facility: HOSPITAL | Age: 29
LOS: 1 days | Discharge: HOME | End: 2019-06-10

## 2019-06-10 ENCOUNTER — APPOINTMENT (OUTPATIENT)
Dept: ANTEPARTUM | Facility: CLINIC | Age: 29
End: 2019-06-10
Payer: MEDICAID

## 2019-06-10 ENCOUNTER — RESULT CHARGE (OUTPATIENT)
Age: 29
End: 2019-06-10

## 2019-06-10 ENCOUNTER — NON-APPOINTMENT (OUTPATIENT)
Age: 29
End: 2019-06-10

## 2019-06-10 VITALS — DIASTOLIC BLOOD PRESSURE: 70 MMHG | SYSTOLIC BLOOD PRESSURE: 100 MMHG | BODY MASS INDEX: 30.42 KG/M2 | WEIGHT: 206 LBS

## 2019-06-10 DIAGNOSIS — K80.20 CALCULUS OF GALLBLADDER WITHOUT CHOLECYSTITIS WITHOUT OBSTRUCTION: Chronic | ICD-10-CM

## 2019-06-10 PROCEDURE — 99213 OFFICE O/P EST LOW 20 MIN: CPT

## 2019-06-10 PROCEDURE — 76815 OB US LIMITED FETUS(S): CPT | Mod: 26

## 2019-06-11 LAB
BILIRUB UR QL STRIP: NORMAL
CLARITY UR: CLEAR
COLLECTION METHOD: NORMAL
GLUCOSE UR-MCNC: NORMAL
HCG UR QL: 0.2 EU/DL
HGB UR QL STRIP.AUTO: NORMAL
KETONES UR-MCNC: NORMAL
LEUKOCYTE ESTERASE UR QL STRIP: NORMAL
NITRITE UR QL STRIP: NORMAL
PH UR STRIP: 6.5
PROT UR STRIP-MCNC: NORMAL
SP GR UR STRIP: 1.02

## 2019-06-12 DIAGNOSIS — Z34.92 ENCOUNTER FOR SUPERVISION OF NORMAL PREGNANCY, UNSPECIFIED, SECOND TRIMESTER: ICD-10-CM

## 2019-07-08 ENCOUNTER — APPOINTMENT (OUTPATIENT)
Dept: OBGYN | Facility: CLINIC | Age: 29
End: 2019-07-08

## 2019-07-09 ENCOUNTER — NON-APPOINTMENT (OUTPATIENT)
Age: 29
End: 2019-07-09

## 2019-08-20 ENCOUNTER — APPOINTMENT (OUTPATIENT)
Dept: ANTEPARTUM | Facility: CLINIC | Age: 29
End: 2019-08-20

## 2019-11-08 ENCOUNTER — EMERGENCY (EMERGENCY)
Facility: HOSPITAL | Age: 29
LOS: 0 days | Discharge: HOME | End: 2019-11-08
Admitting: EMERGENCY MEDICINE
Payer: MEDICAID

## 2019-11-08 VITALS
SYSTOLIC BLOOD PRESSURE: 134 MMHG | HEART RATE: 75 BPM | RESPIRATION RATE: 18 BRPM | DIASTOLIC BLOOD PRESSURE: 75 MMHG | OXYGEN SATURATION: 99 %

## 2019-11-08 VITALS
SYSTOLIC BLOOD PRESSURE: 180 MMHG | DIASTOLIC BLOOD PRESSURE: 81 MMHG | OXYGEN SATURATION: 99 % | HEART RATE: 91 BPM | TEMPERATURE: 99 F | RESPIRATION RATE: 18 BRPM

## 2019-11-08 DIAGNOSIS — H66.91 OTITIS MEDIA, UNSPECIFIED, RIGHT EAR: ICD-10-CM

## 2019-11-08 DIAGNOSIS — R05 COUGH: ICD-10-CM

## 2019-11-08 DIAGNOSIS — K80.20 CALCULUS OF GALLBLADDER WITHOUT CHOLECYSTITIS WITHOUT OBSTRUCTION: Chronic | ICD-10-CM

## 2019-11-08 DIAGNOSIS — R50.9 FEVER, UNSPECIFIED: ICD-10-CM

## 2019-11-08 PROCEDURE — 99283 EMERGENCY DEPT VISIT LOW MDM: CPT

## 2019-11-08 RX ORDER — AMOXICILLIN 250 MG/5ML
1 SUSPENSION, RECONSTITUTED, ORAL (ML) ORAL
Qty: 20 | Refills: 0
Start: 2019-11-08 | End: 2019-11-17

## 2019-11-08 RX ORDER — DEXAMETHASONE 0.5 MG/5ML
10 ELIXIR ORAL ONCE
Refills: 0 | Status: COMPLETED | OUTPATIENT
Start: 2019-11-08 | End: 2019-11-08

## 2019-11-08 RX ADMIN — Medication 10 MILLIGRAM(S): at 22:20

## 2019-11-08 NOTE — ED PROVIDER NOTE - NSFOLLOWUPINSTRUCTIONS_ED_ALL_ED_FT
Otitis Media    Otitis media is redness, soreness, and inflammation of the middle ear. Otitis media may be caused by allergies or, most commonly, by infection. Often it occurs as a complication of the common cold. Symptoms may include earache, fever, ringing in your ears, leakage of fluid from ear, hearing changes. If you were prescribed an antibiotic medicine, finish it all even if you start to feel better.     SEEK IMMEDIATE MEDICAL CARE IF YOU HAVE THE FOLLOWING SYMPTOMS: pain that is not controlled with medicine, swelling/redness/pain around your ear, facial paralysis, tenderness of the bone behind your ear when you touch it, neck lump or neck stiffness.  Acute Cough    WHAT YOU NEED TO KNOW:    An acute cough can last up to 3 weeks. Common causes of an acute cough include a cold, allergies, or a lung infection.     DISCHARGE INSTRUCTIONS:    Return to the emergency department if:     You have trouble breathing or feel short of breath.      You cough up blood, or you see blood in your mucus.       You faint or feel weak or dizzy.       You have chest pain when you cough or take a deep breath.       You have new wheezing.     Contact your healthcare provider if:     You have a fever.       Your cough lasts longer than 4 weeks.       Your symptoms do not improve with treatment.       You have questions or concerns about your condition or care.     Medicines:     Medicines may be needed to stop the cough, decrease swelling in your airways, or help open your airways. Medicine may also be given to help you cough up mucus. Ask your healthcare provider what over-the-counter medicines you can take. If you have an infection caused by bacteria, you may need antibiotics.       Take your medicine as directed. Contact your healthcare provider if you think your medicine is not helping or if you have side effects. Tell him or her if you are allergic to any medicine. Keep a list of the medicines, vitamins, and herbs you take. Include the amounts, and when and why you take them. Bring the list or the pill bottles to follow-up visits. Carry your medicine list with you in case of an emergency.    Manage your symptoms:     Do not smoke and stay away from others who smoke. Nicotine and other chemicals in cigarettes and cigars can cause lung damage and make your cough worse. Ask your healthcare provider for information if you currently smoke and need help to quit. E-cigarettes or smokeless tobacco still contain nicotine. Talk to your healthcare provider before you use these products.       Drink extra liquids as directed. Liquids will help thin and loosen mucus so you can cough it up. Liquids will also help prevent dehydration. Examples of good liquids to drink include water, fruit juice, and broth. Do not drink liquids that contain caffeine. Caffeine can increase your risk for dehydration. Ask your healthcare provider how much liquid to drink each day.       Rest as directed. Do not do activities that make your cough worse, such as exercise.       Use a humidifier or vaporizer. Use a cool mist humidifier or a vaporizer to increase air moisture in your home. This may make it easier for you to breathe and help decrease your cough.       Eat 2 to 5 mL of honey 2 times each day. Honey can help thin mucus and decrease your cough.       Use cough drops or lozenges. These can help decrease throat irritation and your cough.     Follow up with your healthcare provider as directed: Write down your questions so you remember to ask them during your visits.        © Copyright [employer] 2019 All illustrations and images included in CareNotes are the copyrighted property of [employer] or [employer].

## 2019-11-08 NOTE — ED PROVIDER NOTE - PHYSICAL EXAMINATION
CONSTITUTIONAL: Well-appearing; well-nourished; in no apparent distress.   ENT: right tm erythema; normal nose; no rhinorrhea; normal pharynx with no tonsillar hypertrophy.   NECK: Supple; non-tender; no cervical lymphadenopathy. No JVD.   CARDIOVASCULAR: Normal S1, S2; no murmurs, rubs, or gallops.   RESPIRATORY: coarse bs throughout, Normal chest excursion with respiration.  SKIN: Normal for age and race; warm; dry; good turgor; no apparent lesions or exudate.   NEURO/PSYCH: A & O x 4; grossly unremarkable. mood and manner are appropriate. Grooming and personal hygiene are appropriate. No apparent thoughts of harm to self or others.

## 2019-11-08 NOTE — ED ADULT NURSE NOTE - NSIMPLEMENTINTERV_GEN_ALL_ED
Implemented All Universal Safety Interventions:  [place] to call system. Call bell, personal items and telephone within reach. Instruct patient to call for assistance. Room bathroom lighting operational. Non-slip footwear when patient is off stretcher. Physically safe environment: no spills, clutter or unnecessary equipment. Stretcher in lowest position, wheels locked, appropriate side rails in place.

## 2019-11-08 NOTE — ED PROVIDER NOTE - OBJECTIVE STATEMENT
pt in ED with 3 children, all for same sxs of fever, cough, congestion, right ear pain for 4 days  pain is sharp, nonradiating, moderate  denies exacerbating or relieving factors  Denies HA/dizziness/chest pain/palpitation/sob/abd pain/n/v/d/ black stool/bloody stool/urinary sxs

## 2019-11-08 NOTE — ED PROVIDER NOTE - NSFOLLOWUPCLINICS_GEN_ALL_ED_FT
A Family Medicine Doctor  Family Medicine  .  [place]   Phone:   Fax:   Follow Up Time:     [facility]  ENT  [address], 2nd [place] [address]  Phone: [phone]  Fax:   Follow Up Time:

## 2019-11-08 NOTE — ED PROVIDER NOTE - PROGRESS NOTE DETAILS
discussed possible side effects of abx. including but not limited to cdiff/resistance/Gi upset. if intolerance, dc use and return to office . Also if there is no improvement, return for re-evaluation. advised to take probiotics  Meningitis, malignant otitis, mastoiditis, considered, not supported.

## 2019-11-08 NOTE — ED PROVIDER NOTE - PATIENT PORTAL LINK FT
You can access the FollowMyHealth Patient Portal offered by [facility] by registering at the following website: [email or web address]. By joining [employer]’s FollowMyHealth portal, you will also be able to view your health information using other applications (apps) compatible with our system.

## 2020-10-22 ENCOUNTER — EMERGENCY (EMERGENCY)
Facility: HOSPITAL | Age: 30
LOS: 0 days | Discharge: HOME | End: 2020-10-22
Attending: EMERGENCY MEDICINE | Admitting: EMERGENCY MEDICINE
Payer: MEDICAID

## 2020-10-22 VITALS
OXYGEN SATURATION: 98 % | TEMPERATURE: 99 F | SYSTOLIC BLOOD PRESSURE: 109 MMHG | HEART RATE: 78 BPM | HEIGHT: 70 IN | RESPIRATION RATE: 18 BRPM | DIASTOLIC BLOOD PRESSURE: 60 MMHG

## 2020-10-22 DIAGNOSIS — K80.20 CALCULUS OF GALLBLADDER WITHOUT CHOLECYSTITIS WITHOUT OBSTRUCTION: Chronic | ICD-10-CM
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
## 2020-10-22 DIAGNOSIS — M79.646 PAIN IN UNSPECIFIED FINGER(S): ICD-10-CM

## 2020-10-22 DIAGNOSIS — S61.307A UNSPECIFIED OPEN WOUND OF LEFT LITTLE FINGER WITH DAMAGE TO NAIL, INITIAL ENCOUNTER: ICD-10-CM
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
## 2020-10-22 PROCEDURE — 64450 NJX AA&/STRD OTHER PN/BRANCH: CPT

## 2020-10-22 PROCEDURE — 99283 EMERGENCY DEPT VISIT LOW MDM: CPT | Mod: 25

## 2020-10-22 PROCEDURE — 73130 X-RAY EXAM OF HAND: CPT | Mod: 26,LT

## 2020-10-22 NOTE — ED PROVIDER NOTE - CLINICAL SUMMARY MEDICAL DECISION MAKING FREE TEXT BOX
pt presents for L nailbed pain with fake nail, nail was removed, nail with no signs of infection, pt tolerated well, wound cleansed, dressed, pt aware of proper wound care, signs and symptoms to return for, will follow up as discussed . discussed not getting nails done at this levy due to exposed raw skin, pt aware and agrees.

## 2020-10-22 NOTE — ED PROVIDER NOTE - NSFOLLOWUPCLINICS_GEN_ALL_ED_FT
[facility]  Medicine  [address], [place]   Phone: [phone]  Fax:   Follow Up Time: 1-3 Days

## 2020-10-22 NOTE — ED PROVIDER NOTE - PROGRESS NOTE DETAILS
ED Attending MARILU Booth  Nail cleansed, digitial block copleted, fake nail and with attached nail removed, tolerated well, no complications, finger clean dry, no signs of infection no drainage, no erythema, no crepitus, no induration, no streaking, FROM , pt aware of proper wound care, signs and symptoms to return for, had pmd  but also would like medicine clinic number, will follow up. ED Attending MARILU Booth  Nail cleansed, digitial block completed, fake nail and with attached nail removed, tolerated well, no complications, finger clean dry, no signs of infection no drainage, no erythema, no crepitus, no induration, no streaking, FROM , pt aware of proper wound care, signs and symptoms to return for, had pmd  but also would like medicine clinic number, will follow up.

## 2020-10-22 NOTE — ED PROVIDER NOTE - OBJECTIVE STATEMENT
The pt is a 30y F w/ no pmh presenting after L hand 5th digit injury 2 days ago. Pt was reaching into the pocket of the backseat of her car when she smashed  the finger against something. Then when she was pulling out her hand, the nail (and artificial nail overlying) got caught on something and was pulled. Says the nail was partially avulsed and she was able to flip it over. Over the last 2 days doing wet soaks. Pain is tolerable. Coming in because today she was no longer able to pull nail up and was concerned that she could not clean the area well. Endorses some drainage from under the nail today. Denies fever, headache, chest pain, SOB, N/V, abdominal pain, diarrhea.

## 2020-10-22 NOTE — ED PROVIDER NOTE - NSPTACCESSSVCSAPPTDETAILS_ED_ALL_ED_FT
Please follow up with your Primary Care Physician in 1-3 days. Medicine clinic has been provided as a back up.

## 2020-10-22 NOTE — ED PROVIDER NOTE - NSFOLLOWUPINSTRUCTIONS_ED_ALL_ED_FT
Please keep your finger clean. Wash the area with soap and water twice a day and apply Bacitracin to avoid infection.    Pleaser return to the Emergency Room for any new or worsening symptoms such as increased redness of the finger, uncontrolled pain, or fever.

## 2020-10-22 NOTE — ED PROVIDER NOTE - NS ED ROS FT
Constitutional: (-) fever  Eyes/ENT: (-) blurry vision, (-) epistaxis  Cardiovascular: (-) chest pain, (-) syncope  Respiratory: (-) cough, (-) shortness of breath  Gastrointestinal: (-) vomiting, (-) diarrhea  Musculoskeletal: (-) neck pain, (-) back pain, (-) joint pain, (+) partially avulsed nail L hand 5th digit   Integumentary: (-) rash, (-) edema  Neurological: (-) headache, (-) altered mental status  Psychiatric: (-) hallucinations  Allergic/Immunologic: (-) pruritus

## 2020-10-22 NOTE — ED ADULT NURSE NOTE - OBJECTIVE STATEMENT
left pinky jammed in car door yesterday. pain to pain still today. pain to nail bed. no redness or swelling noted

## 2020-10-22 NOTE — ED PROVIDER NOTE - ATTENDING CONTRIBUTION TO CARE
31 y/o f w/ no sig pmhx presents with L fifth finger nail partially avulsed after she jammed ~ 2 days ago, reports she noticed it was coming off more and had drainage today so came to ed. pt has artifical nials on as well.  No fever, chills, nausea, vomiting, numbness/tingling, decreased sensation, lacerations, abrasions, or any other trauma. UTD on tetanus.     on exam: WDWN appearing female in nad, L fifth artifical nail broken and able to be pulled back with real nail ~ 80 percent of the way. No lacerations, abrasions, or ecchymoses. No crepitus, no induration or fluctuance. No odor, Mild yellow fluid obtained underneath fake nail.  Radial pulses 2/4 b/l. Cap refill < 2 seconds, No obvious bony deformity. Good finger opposition, FROM of L wrist in flexion, extension, ulna and radial deviation. No snuff box tenderness.    FROM of L elbow in flexion, extensions, supination and pronation, and L shoulder in flexion, extension, abduction, and adduction with no pain to palpation. GCS 15.

## 2020-10-22 NOTE — ED PROVIDER NOTE - PATIENT PORTAL LINK FT
You can access the FollowMyHealth Patient Portal offered by [facility] by registering at the following website: [email or web address]. By joining [employer]’s FollowMyHealth portal, you will also be able to view your health information using other applications (apps) compatible with our system.

## 2020-10-22 NOTE — ED PROVIDER NOTE - CARE PLAN
Principal Discharge DX:	Nail avulsion, finger, initial encounter   Principal Discharge DX:	Nail avulsion, finger, initial encounter  Assessment and plan of treatment:	Plan: L hand xray, wound care, reassess.

## 2020-11-24 NOTE — ED PROCEDURE NOTE - ATTENDING CONTRIBUTION TO CARE
Lab orders placed for well adult.   Pt should f/u for annual physical
I was present for and supervised the key/critical aspects of the procedures performed during the care of the patient.
I was present for and supervised the key/critical aspects of the procedures performed during the care of the patient.

## 2021-02-03 NOTE — CONSULT NOTE ADULT - ATTENDING COMMENTS
UCs q 10 min btwn 11pm-7am; reassurance and precautions Patient seen and examined  in Ed awaiting bed  but now completely asymptomatic with normal LFT probable biliary colic than cholecystitis. Sono and labs reviewed and discussed with patient options. Patient wants to go home now for school start in 2 days  and discussed management plans.

## 2022-02-05 NOTE — ED PROVIDER NOTE - NS ED ROS FT
Constitutional: no recent weight loss, change in appetite   Eyes: no redness/discharge/pain/vision changes  ENT: no rhinorrhea/sore throat  Cardiac: No chest pain, SOB or edema.  Respiratory: No respiratory distress  GI: No nausea, vomiting, diarrhea or abdominal pain.  : No dysuria, frequency, urgency or hematuria  MS: no pain to back or extremities, no loss of ROM, no weakness  Neuro: No headache or weakness. No LOC.  Skin: No skin rash.  Endocrine: No history of thyroid disease or diabetes.  Except as documented in the HPI, all other systems are negative. generalized

## 2022-09-14 ENCOUNTER — OUTPATIENT (OUTPATIENT)
Dept: OUTPATIENT SERVICES | Facility: HOSPITAL | Age: 32
LOS: 1 days | Discharge: HOME | End: 2022-09-14

## 2022-09-14 DIAGNOSIS — K80.20 CALCULUS OF GALLBLADDER WITHOUT CHOLECYSTITIS WITHOUT OBSTRUCTION: Chronic | ICD-10-CM

## 2022-11-20 NOTE — ED ADULT TRIAGE NOTE - CHIEF COMPLAINT QUOTE
jammed left pinky in car x 2 days ago, c/o pain under nailbed
[Implants in position] : Implants in position
[No loosening of hardware] : No loosening of hardware
[Normal Coordination] : normal coordination
[Normal DTR UE/LE] : normal DTR UE/LE 
[Normal Mood and Affect] : normal mood and affect
[Normal Sensation] : normal sensation
[Orientated] : orientated
[Able to Communicate] : able to communicate
No
[Normal Skin] : normal skin
[No Rash] : no rash
[No Ulcers] : no ulcers
[No Lesions] : no lesions
[Well Developed] : well developed
[No obvious lymphadenopathy in areas examined] : no obvious lymphadenopathy in areas examined
[Well Nourished] : well nourished
[Peripheral vascular exam is grossly normal] : peripheral vascular exam is grossly normal
[No Respiratory Distress] : no respiratory distress
[de-identified] : Constitutional:\par - General Appearance:\par Unremarkable\par Body Habitus\par Well Developed\par Well Nourished\par Body Habitus\par No Deformities\par Well Groomed\par Ability To communicate:\par Normal\par Neurologic:\par Global sensation is intact to upper and lower extremities. See examination of Neck and/or Spine\par for exceptions.\par Orientation to Time, Place and Person is: Normal\par Mood And Affect is Normal\par Skin:\par - Head/Face, Right Upper/Lower Extremity, Left Upper/Lower Extremity: Normal\par See Examination of Neck and/or Spine for exceptions\par Cardiovascular:\par Peripheral Cardiovascular System is Normal\par Palpation of Lymph Nodes:\par Normal Palpation of lymph nodes in: Axilla, Cervical, Inguinal\par Abnormal Palpation of lymph nodes in: None 
[] : no sero-sanguinous drainage

## 2022-12-08 NOTE — ED ADULT TRIAGE NOTE - MODE OF ARRIVAL
Cannot sign referral for podiatry without a diagnosis.  Diagnosis is she seeing the podiatrist for?  We have nothing in the medical record.   Walk in

## 2023-03-17 NOTE — ED PROVIDER NOTE - NS ED ATTENDING STATEMENT MOD
.
I have personally seen and examined this patient.  I have fully participated in the care of this patient. I have reviewed all pertinent clinical information, including history, physical exam, plan and the Resident’s note and agree except as noted.

## 2023-09-20 NOTE — ED ADULT NURSE NOTE - TEMPLATE LIST FOR HEAD TO TOE ASSESSMENT
Abdominal Pain, N/V/D Pre-Excision Curettage Text (Leave Blank If You Do Not Want): Prior to drawing the surgical margin the visible lesion was removed with electrodesiccation and curettage to clearly define the lesion size.

## 2023-12-26 NOTE — ED ADULT TRIAGE NOTE - ARRIVAL FROM
[No Acute Distress] : no acute distress [No Resp Distress] : no resp distress [Clear to Auscultation Bilaterally] : clear to auscultation bilaterally Home

## 2024-03-18 NOTE — ED PROVIDER NOTE - NSCAREINITIATED _GEN_ER
Kit Parker) [Joint Pain] : joint pain [Joint Stiffness] : joint stiffness [Joint Swelling] : joint swelling [Negative] : Heme/Lymph [FreeTextEntry9] : left hip and left  knee

## 2024-03-28 NOTE — ED ADULT TRIAGE NOTE - CCCP TRG CHIEF CMPLNT
Call patient.  His LDL cholesterol is elevated.  Has he \"really\" been taking Crestor 20 mg daily without missed doses?  If not please start taking.  If yes, increase rosuvastatin from 20 to 40 mg daily.  Repeat labs one week BEFORE his next appointment.   flu-like symptoms

## 2024-05-13 ENCOUNTER — EMERGENCY (EMERGENCY)
Facility: HOSPITAL | Age: 34
LOS: 0 days | Discharge: HOME | End: 2024-05-13
Attending: STUDENT IN AN ORGANIZED HEALTH CARE EDUCATION/TRAINING PROGRAM
Payer: MEDICAID

## 2024-05-13 VITALS
HEART RATE: 96 BPM | OXYGEN SATURATION: 96 % | DIASTOLIC BLOOD PRESSURE: 76 MMHG | WEIGHT: 205.03 LBS | SYSTOLIC BLOOD PRESSURE: 111 MMHG | TEMPERATURE: 99 F | RESPIRATION RATE: 18 BRPM

## 2024-05-13 DIAGNOSIS — U07.1 COVID-19: ICD-10-CM

## 2024-05-13 DIAGNOSIS — J02.9 ACUTE PHARYNGITIS, UNSPECIFIED: ICD-10-CM

## 2024-05-13 DIAGNOSIS — R06.02 SHORTNESS OF BREATH: ICD-10-CM

## 2024-05-13 DIAGNOSIS — R51.9 HEADACHE, UNSPECIFIED: ICD-10-CM

## 2024-05-13 DIAGNOSIS — K80.20 CALCULUS OF GALLBLADDER WITHOUT CHOLECYSTITIS WITHOUT OBSTRUCTION: Chronic | ICD-10-CM

## 2024-05-13 DIAGNOSIS — F17.200 NICOTINE DEPENDENCE, UNSPECIFIED, UNCOMPLICATED: ICD-10-CM

## 2024-05-13 LAB
FLUAV AG NPH QL: SIGNIFICANT CHANGE UP
FLUBV AG NPH QL: SIGNIFICANT CHANGE UP
RSV RNA NPH QL NAA+NON-PROBE: SIGNIFICANT CHANGE UP
SARS-COV-2 RNA SPEC QL NAA+PROBE: DETECTED

## 2024-05-13 PROCEDURE — 99283 EMERGENCY DEPT VISIT LOW MDM: CPT

## 2024-05-13 PROCEDURE — 0241U: CPT

## 2024-05-13 PROCEDURE — 99284 EMERGENCY DEPT VISIT MOD MDM: CPT

## 2024-05-13 RX ORDER — METOCLOPRAMIDE HCL 10 MG
1 TABLET ORAL
Qty: 9 | Refills: 0
Start: 2024-05-13 | End: 2024-05-15

## 2024-05-13 RX ORDER — METOCLOPRAMIDE HCL 10 MG
10 TABLET ORAL ONCE
Refills: 0 | Status: COMPLETED | OUTPATIENT
Start: 2024-05-13 | End: 2024-05-13

## 2024-05-13 RX ORDER — IBUPROFEN 200 MG
600 TABLET ORAL ONCE
Refills: 0 | Status: COMPLETED | OUTPATIENT
Start: 2024-05-13 | End: 2024-05-13

## 2024-05-13 RX ADMIN — Medication 10 MILLIGRAM(S): at 19:22

## 2024-05-13 RX ADMIN — Medication 600 MILLIGRAM(S): at 19:23

## 2024-05-13 NOTE — ED PROVIDER NOTE - PATIENT PORTAL LINK FT
You can access the FollowMyHealth Patient Portal offered by [facility] by registering at the following website: [email or web address]. By joining [employer]’s FollowMyHealth portal, you will also be able to view your health information using other applications (apps) compatible with our system.

## 2024-05-13 NOTE — ED PROVIDER NOTE - OBJECTIVE STATEMENT
34-year-old female, smoker with no significant past medical history, presents to the emergency department with flulike symptoms onset yesterday.  Symptoms include headache, nausea, sore throat, fatigue, myalgias, subjective fevers, chills, SOB.  Last took Motrin at 14 and Tylenol at 2.  Denies vomiting, diarrhea, abdominal pain, known sick contacts.  LMP last week.

## 2024-05-13 NOTE — ED PROVIDER NOTE - CLINICAL SUMMARY MEDICAL DECISION MAKING FREE TEXT BOX
pt with viral uri    Appropriate medications for patient's presenting complaints were ordered and effects were reassessed. Patient's external records were reviewed    Escalation to admission and/or observation was considered.  Patient feels much better and is comfortable with discharge.  Appropriate follow-up was arranged.

## 2024-05-13 NOTE — ED PROVIDER NOTE - PHYSICAL EXAMINATION
GENERAL: Well-developed; well-nourished; in no acute distress.   SKIN: warm, dry  HEAD: Normocephalic; atraumatic.  EYES: 3mm pupils, PERRLA, EOMI, no conjunctival erythema  ENT: No nasal discharge; airway clear. MMM  NECK: Supple; non tender.  CARD: S1, S2 normal; no murmurs, gallops, or rubs. Regular rate and rhythm.   RESP: LCTAB; No wheezes, rales, rhonchi, or stridor.  ABD: soft, nontender, nondistended  EXT: Normal ROM.  No LE TTP or edema bilaterally.  NEURO: A/ox3, grossly unremarkable  PSYCH: Cooperative, appropriate. CT abdomen within normal limits CT abdomen within normal limits  small pulm nodule noted

## 2024-05-13 NOTE — ED PROVIDER NOTE - NSFOLLOWUPINSTRUCTIONS_ED_ALL_ED_FT
FOLLOW UP WITH YOUR DOCTOR THIS WEEK FOR REEVALUATION.     RETURN TO ED IMMEDIATELY WITH ANY WORSENING SYMPTOMS, CHEST PAIN, SHORTNESS OF BREATH, ABDOMINAL PAIN, FEVERS, WEAKNESS, DIZZINESS, PERSISTENT OR SEVERE HEADACHE OR ANY OTHER CONCERNS.    You likely have a viral illness. No antibiotics are required to treat it. Instead you should give yourself plenty of rest, relaxation, fluids, and vitamins until your body is able to clear it. Initially you may not feel your best, you may even have fevers that can be treated with tylenol, you should start to feel better in a few days time. If no improvement is noted within a weeks time or if you have high grade fevers that are not improving, be sure to see your doctor or return to the ER.

## 2025-02-03 ENCOUNTER — OUTPATIENT (OUTPATIENT)
Dept: OUTPATIENT SERVICES | Facility: HOSPITAL | Age: 35
LOS: 1 days | End: 2025-02-03
Payer: COMMERCIAL

## 2025-02-03 DIAGNOSIS — K80.20 CALCULUS OF GALLBLADDER WITHOUT CHOLECYSTITIS WITHOUT OBSTRUCTION: Chronic | ICD-10-CM

## 2025-02-03 DIAGNOSIS — K02.9 DENTAL CARIES, UNSPECIFIED: ICD-10-CM

## 2025-02-03 PROCEDURE — D0140: CPT

## 2025-02-03 PROCEDURE — D0220: CPT

## 2025-02-05 DIAGNOSIS — K02.9 DENTAL CARIES, UNSPECIFIED: ICD-10-CM

## 2025-02-26 ENCOUNTER — OUTPATIENT (OUTPATIENT)
Dept: OUTPATIENT SERVICES | Facility: HOSPITAL | Age: 35
LOS: 1 days | End: 2025-02-26

## 2025-02-26 DIAGNOSIS — K80.20 CALCULUS OF GALLBLADDER WITHOUT CHOLECYSTITIS WITHOUT OBSTRUCTION: Chronic | ICD-10-CM

## 2025-02-26 DIAGNOSIS — K01.1 IMPACTED TEETH: ICD-10-CM

## 2025-02-26 PROCEDURE — D7210: CPT

## 2025-04-21 NOTE — ED ADULT NURSE NOTE - NSFALLASSESSNEED_ED_ALL_ED
In a few weeks we will be leaving to go on a one month trip out of town not to return until early June. In preparing my medicines I realized that I have enough Rosuvastatin 5 mg tablets to get me to the end of May so I will be a few pills short of what I will need in May. The prescription that I have has no refills so I will need a new prescription for Rosuvastatin, preferably by next week. Do I need to come in to get that prescription? Medication pended below    no